# Patient Record
Sex: MALE | Race: WHITE | Employment: OTHER | URBAN - METROPOLITAN AREA
[De-identification: names, ages, dates, MRNs, and addresses within clinical notes are randomized per-mention and may not be internally consistent; named-entity substitution may affect disease eponyms.]

---

## 2017-01-02 ENCOUNTER — THERAPY VISIT (OUTPATIENT)
Dept: PHYSICAL THERAPY | Facility: CLINIC | Age: 67
End: 2017-01-02
Payer: COMMERCIAL

## 2017-01-02 DIAGNOSIS — M54.50 ACUTE BILATERAL LOW BACK PAIN WITHOUT SCIATICA: Primary | ICD-10-CM

## 2017-01-02 PROCEDURE — 97110 THERAPEUTIC EXERCISES: CPT | Mod: GP | Performed by: PHYSICAL THERAPIST

## 2017-01-02 PROCEDURE — 97161 PT EVAL LOW COMPLEX 20 MIN: CPT | Mod: GP | Performed by: PHYSICAL THERAPIST

## 2017-01-05 ENCOUNTER — THERAPY VISIT (OUTPATIENT)
Dept: PHYSICAL THERAPY | Facility: CLINIC | Age: 67
End: 2017-01-05
Payer: COMMERCIAL

## 2017-01-05 DIAGNOSIS — M54.50 ACUTE BILATERAL LOW BACK PAIN WITHOUT SCIATICA: Primary | ICD-10-CM

## 2017-01-05 PROCEDURE — 97110 THERAPEUTIC EXERCISES: CPT | Mod: GP | Performed by: PHYSICAL THERAPIST

## 2017-01-05 PROCEDURE — 97140 MANUAL THERAPY 1/> REGIONS: CPT | Mod: GP | Performed by: PHYSICAL THERAPIST

## 2017-01-09 ENCOUNTER — THERAPY VISIT (OUTPATIENT)
Dept: PHYSICAL THERAPY | Facility: CLINIC | Age: 67
End: 2017-01-09
Payer: COMMERCIAL

## 2017-01-09 DIAGNOSIS — M54.50 ACUTE BILATERAL LOW BACK PAIN WITHOUT SCIATICA: Primary | ICD-10-CM

## 2017-01-09 PROCEDURE — 97112 NEUROMUSCULAR REEDUCATION: CPT | Mod: GP | Performed by: PHYSICAL THERAPIST

## 2017-01-09 PROCEDURE — 97530 THERAPEUTIC ACTIVITIES: CPT | Mod: GP | Performed by: PHYSICAL THERAPIST

## 2017-01-09 PROCEDURE — 97110 THERAPEUTIC EXERCISES: CPT | Mod: GP | Performed by: PHYSICAL THERAPIST

## 2017-01-23 ENCOUNTER — THERAPY VISIT (OUTPATIENT)
Dept: PHYSICAL THERAPY | Facility: CLINIC | Age: 67
End: 2017-01-23
Payer: COMMERCIAL

## 2017-01-23 DIAGNOSIS — M54.50 ACUTE BILATERAL LOW BACK PAIN WITHOUT SCIATICA: Primary | ICD-10-CM

## 2017-01-23 PROCEDURE — 97530 THERAPEUTIC ACTIVITIES: CPT | Mod: GP | Performed by: PHYSICAL THERAPIST

## 2017-01-23 PROCEDURE — 97110 THERAPEUTIC EXERCISES: CPT | Mod: GP | Performed by: PHYSICAL THERAPIST

## 2017-02-02 ENCOUNTER — THERAPY VISIT (OUTPATIENT)
Dept: PHYSICAL THERAPY | Facility: CLINIC | Age: 67
End: 2017-02-02
Payer: COMMERCIAL

## 2017-02-02 DIAGNOSIS — M54.50 ACUTE BILATERAL LOW BACK PAIN WITHOUT SCIATICA: Primary | ICD-10-CM

## 2017-02-02 PROCEDURE — 97110 THERAPEUTIC EXERCISES: CPT | Mod: GP | Performed by: PHYSICAL THERAPIST

## 2017-02-02 PROCEDURE — 97140 MANUAL THERAPY 1/> REGIONS: CPT | Mod: GP | Performed by: PHYSICAL THERAPIST

## 2017-02-02 NOTE — PROGRESS NOTES
Subjective:    HPI  Oswestry Score: 0 %                 Objective:    System    Physical Exam    General     ROS    Assessment/Plan:      PROGRESS  REPORT    Progress reporting period is from 1/2/17 to 2/2/17.       SUBJECTIVE  Subjective changes noted by patient:  Patient reports his back is doing much better overall and has been having minimal pain the past couple week. He reports HEP is going well and that he is excited to get back out on his bike. He has been going to the gym and doing the stationary bike and will get a bit sore after this. Patient reports he feels comfortable continuing with HEP going forward.    Current Pain level: 0/10.      Initial Pain level: 9/10.   Changes in function:  Yes (See Goal flowsheet attached for changes in current functional level)  Adverse reaction to treatment or activity: None    OBJECTIVE  Changes noted in objective findings: AROM Lumbar flexion min loss, extension min-mod loss. Patient demonstrates with improved arm swing and trunk rotation with ambulation. He does demonstrate with lumbar segmental hypomobility which restricts extension motion a bit.     ASSESSMENT/PLAN  Updated problem list and treatment plan: Diagnosis 1:  Low back pain    Decreased ROM/flexibility - manual therapy, therapeutic exercise and home program  Impaired muscle performance - neuro re-education and home program  STG/LTGs have been met or progress has been made towards goals:  Yes (See Goal flow sheet completed today.)  Assessment of Progress: The patient's condition is improving.  The patient has met all of their long term goals.  Self Management Plans:  Patient has been instructed in a home treatment program.  Patient  has been instructed in self management of symptoms.  I have re-evaluated this patient and find that the nature, scope, duration and intensity of the therapy is appropriate for the medical condition of the patient.  Jose Cruz continues to require the following intervention to meet STG  and LTG's:  Continuation with HEP for near future    Recommendations:  Patient is encouraged to call PT if any questions/concerns arise regarding HEP or if he experiences any exacerbation of symptoms in the near future. Will follow-up in 3-4 weeks if further therapy is indicated by change in symptoms. Discharge from PT at that time.      Please refer to the daily flowsheet for treatment today, total treatment time and time spent performing 1:1 timed codes.

## 2017-02-21 ENCOUNTER — THERAPY VISIT (OUTPATIENT)
Dept: NEUROLOGY | Facility: CLINIC | Age: 67
End: 2017-02-21

## 2017-02-21 NOTE — MR AVS SNAPSHOT
After Visit Summary   2/21/2017    Jose Cruz Jameson    MRN: [id number]           Patient Information     Date Of Birth          1950        Visit Information        Provider Department      2/21/2017 1:30 PM Alyssa Goldsmith OT [facility]         Follow-ups after your visit        Who to contact     Please call your clinic at [phone] to:    Ask questions about your health    Make or cancel appointments    Discuss your medicines    Learn about your test results    Speak to your doctor   If you have compliments or concerns about an experience at your clinic, or if you wish to file a complaint, please contact [facility] Patient Relations at [phone] or email us at [email or web address]         Additional Information About Your Visit        MyChart Information     Healthcare ITt gives you secure access to your electronic health record. If you see a primary care provider, you can also send messages to your care team and make appointments. If you have questions, please call your primary care clinic.  If you do not have a primary care provider, please call [phone] and they will assist you.      Native is an electronic gateway that provides easy, online access to your medical records. With Native, you can request a clinic appointment, read your test results, renew a prescription or communicate with your care team.     To access your existing account, please contact your [facility] or call [phone] for assistance.        Care EveryWhere ID     This is your Care EveryWhere ID. This could be used by other organizations to access your [place] medical records  [id number]         Blood Pressure from Last 3 Encounters:   12/30/16 120/80   11/19/16 122/68   07/11/16 105/64    Weight from Last 3 Encounters:   12/30/16 179 lb 6.4 oz (81.4 kg)   07/11/16 174 lb 12.8 oz (79.3 kg)   06/01/15 182 lb (82.6 kg)              Today, you  had the following     No orders found for display       Primary Care Provider Office Phone # Fax #    Vanessa Lewis [phone]       [facility] [place] [address] [place] [address] [address] [place] [address]        Thank you!     Thank you for choosing [facility]  for your care. Our goal is always to provide you with excellent care. Hearing back from our patients is one way we can continue to improve our services. Please take a few minutes to complete the written survey that you may receive in the mail after your visit with us. Thank you!             Your Updated Medication List - Protect others around you: Learn how to safely use, store and throw away your medicines at www.disposemymeds.org.          This list is accurate as of: 2/21/17  3:58 PM.  Always use your most recent med list.                   Brand Name Dispense Instructions for use    DAILY MULTIVITAMIN PO      Take 1 tablet by mouth daily.       donepezil 10 MG tablet    ARICEPT    90 tablet    Take 1 tablet (10 mg) by mouth daily       ibuprofen 200 MG capsule      Take 600 mg by mouth every 4 hours as needed.       memantine 10 MG tablet    NAMENDA    180 tablet    Take 1 tablet (10 mg) by mouth 2 times daily       sertraline 50 MG tablet    ZOLOFT    45 tablet    Take 0.5 tablets (25 mg) by mouth daily       Vitamin E 200 UNITS Tabs      Take 400 Units by mouth 2 times daily

## 2017-02-21 NOTE — PROGRESS NOTES
OCCUPATIONAL THERAPY TREATMENT  [facility]    [facility] OT CHARGES  98448 (65 minutes) - OT Tx  TOTAL = 65 minutes  OT Dx - Moderate cognitive impairment

## 2017-05-03 ENCOUNTER — TELEPHONE (OUTPATIENT)
Dept: FAMILY MEDICINE | Facility: CLINIC | Age: 67
End: 2017-05-03

## 2017-05-03 NOTE — TELEPHONE ENCOUNTER
5/3/2017    Call Regarding Preventive Health Screening Colonoscopy    Attempt 1    Message on voicemail     Comments:       Outreach   KV

## 2017-06-20 ENCOUNTER — OFFICE VISIT (OUTPATIENT)
Dept: FAMILY MEDICINE | Facility: CLINIC | Age: 67
End: 2017-06-20
Payer: COMMERCIAL

## 2017-06-20 VITALS
SYSTOLIC BLOOD PRESSURE: 104 MMHG | DIASTOLIC BLOOD PRESSURE: 66 MMHG | HEART RATE: 76 BPM | HEIGHT: 74 IN | TEMPERATURE: 97.2 F | WEIGHT: 175.4 LBS | BODY MASS INDEX: 22.51 KG/M2

## 2017-06-20 DIAGNOSIS — F02.80 EARLY ONSET ALZHEIMER'S DEMENTIA WITHOUT BEHAVIORAL DISTURBANCE (H): ICD-10-CM

## 2017-06-20 DIAGNOSIS — F32.5 MAJOR DEPRESSION IN COMPLETE REMISSION (H): ICD-10-CM

## 2017-06-20 DIAGNOSIS — Z13.6 SCREENING FOR AAA (AORTIC ABDOMINAL ANEURYSM): ICD-10-CM

## 2017-06-20 DIAGNOSIS — G30.0 EARLY ONSET ALZHEIMER'S DEMENTIA WITHOUT BEHAVIORAL DISTURBANCE (H): ICD-10-CM

## 2017-06-20 DIAGNOSIS — Z23 NEED FOR VACCINATION: ICD-10-CM

## 2017-06-20 DIAGNOSIS — Z00.00 ENCOUNTER FOR ROUTINE ADULT HEALTH EXAMINATION WITHOUT ABNORMAL FINDINGS: Primary | ICD-10-CM

## 2017-06-20 DIAGNOSIS — E78.5 HYPERLIPIDEMIA LDL GOAL <100: ICD-10-CM

## 2017-06-20 LAB
ALBUMIN SERPL-MCNC: 3.8 G/DL (ref 3.4–5)
ALP SERPL-CCNC: 88 U/L (ref 40–150)
ALT SERPL W P-5'-P-CCNC: 24 U/L (ref 0–70)
ANION GAP SERPL CALCULATED.3IONS-SCNC: 5 MMOL/L (ref 3–14)
AST SERPL W P-5'-P-CCNC: 25 U/L (ref 0–45)
BILIRUB SERPL-MCNC: 0.4 MG/DL (ref 0.2–1.3)
BUN SERPL-MCNC: 14 MG/DL (ref 7–30)
CALCIUM SERPL-MCNC: 8.9 MG/DL (ref 8.5–10.1)
CHLORIDE SERPL-SCNC: 102 MMOL/L (ref 94–109)
CHOLEST SERPL-MCNC: 182 MG/DL
CO2 SERPL-SCNC: 27 MMOL/L (ref 20–32)
CREAT SERPL-MCNC: 0.92 MG/DL (ref 0.66–1.25)
GFR SERPL CREATININE-BSD FRML MDRD: 82 ML/MIN/1.7M2
GLUCOSE SERPL-MCNC: 92 MG/DL (ref 70–99)
HDLC SERPL-MCNC: 55 MG/DL
LDLC SERPL CALC-MCNC: 114 MG/DL
NONHDLC SERPL-MCNC: 127 MG/DL
POTASSIUM SERPL-SCNC: 4.3 MMOL/L (ref 3.4–5.3)
PROT SERPL-MCNC: 7.5 G/DL (ref 6.8–8.8)
SODIUM SERPL-SCNC: 134 MMOL/L (ref 133–144)
TRIGL SERPL-MCNC: 67 MG/DL

## 2017-06-20 PROCEDURE — 99212 OFFICE O/P EST SF 10 MIN: CPT | Mod: 25 | Performed by: FAMILY MEDICINE

## 2017-06-20 PROCEDURE — G0009 ADMIN PNEUMOCOCCAL VACCINE: HCPCS | Performed by: FAMILY MEDICINE

## 2017-06-20 PROCEDURE — 99397 PER PM REEVAL EST PAT 65+ YR: CPT | Mod: 25 | Performed by: FAMILY MEDICINE

## 2017-06-20 PROCEDURE — 90670 PCV13 VACCINE IM: CPT | Performed by: FAMILY MEDICINE

## 2017-06-20 PROCEDURE — 90472 IMMUNIZATION ADMIN EACH ADD: CPT | Performed by: FAMILY MEDICINE

## 2017-06-20 PROCEDURE — 80053 COMPREHEN METABOLIC PANEL: CPT | Performed by: FAMILY MEDICINE

## 2017-06-20 PROCEDURE — 36415 COLL VENOUS BLD VENIPUNCTURE: CPT | Performed by: FAMILY MEDICINE

## 2017-06-20 PROCEDURE — 90715 TDAP VACCINE 7 YRS/> IM: CPT | Performed by: FAMILY MEDICINE

## 2017-06-20 PROCEDURE — 80061 LIPID PANEL: CPT | Performed by: FAMILY MEDICINE

## 2017-06-20 RX ORDER — DONEPEZIL HYDROCHLORIDE 10 MG/1
10 TABLET, FILM COATED ORAL DAILY
Qty: 90 TABLET | Refills: 3 | Status: SHIPPED | OUTPATIENT
Start: 2017-06-20 | End: 2018-04-30

## 2017-06-20 RX ORDER — MEMANTINE HYDROCHLORIDE 10 MG/1
10 TABLET ORAL 2 TIMES DAILY
Qty: 180 TABLET | Refills: 3 | Status: SHIPPED | OUTPATIENT
Start: 2017-06-20 | End: 2018-04-30

## 2017-06-20 NOTE — NURSING NOTE
"Initial /66  Pulse 76  Temp 97.2  F (36.2  C) (Tympanic)  Ht 6' 1.75\" (1.873 m)  Wt 175 lb 6.4 oz (79.6 kg)  BMI 22.67 kg/m2 Estimated body mass index is 22.67 kg/(m^2) as calculated from the following:    Height as of this encounter: 6' 1.75\" (1.873 m).    Weight as of this encounter: 175 lb 6.4 oz (79.6 kg). .      "

## 2017-06-20 NOTE — MR AVS SNAPSHOT
After Visit Summary   6/20/2017    Jose Cruz Jameson    MRN: [id number]           Patient Information     Date Of Birth          1950        Visit Information        Provider Department      6/20/2017 8:20 AM Vanessa Lewis DO [facility]        Today's Diagnoses     Screening for AAA (aortic abdominal aneurysm)    -  1    Early onset Alzheimer's dementia without behavioral disturbance        Hyperlipidemia LDL goal <100        Major depression in complete remission (H)          Care Instructions    We updated your tetanus, pertussis and pneumonia shot today.  You will be due for 1 additional pneumonia shot next year.  Please talk with your insurance about the best place to get your shingles vaccine    You can call [phone] to schedule the ultrasound at the [facility].    I will let you know lab results when I see them.    Please feel free to contact me any time with any concerns.      Preventive Health Recommendations:       Male Ages 65 and over    Yearly exam:             See your health care provider every year in order to  o   Review health changes.   o   Discuss preventive care.    o   Review your medicines if your doctor has prescribed any.    Talk with your health care provider about whether you should have a test to screen for prostate cancer (PSA).    Every 3 years, have a diabetes test (fasting glucose). If you are at risk for diabetes, you should have this test more often.    Every 5 years, have a cholesterol test. Have this test more often if you are at risk for high cholesterol or heart disease.     Every 10 years, have a colonoscopy. Or, have a yearly FIT test (stool test). These exams will check for colon cancer.    Talk to with your health care provider about screening for Abdominal Aortic Aneurysm if you have a family history of AAA or have a history of smoking.  Shots:     Get a flu shot each year.     Get a tetanus shot every 10 years.     Talk to your  doctor about your pneumonia vaccines. There are now two you should receive - Pneumovax (PPSV 23) and Prevnar (PCV 13).    Talk to your doctor about a shingles vaccine.     Talk to your doctor about the hepatitis B vaccine.  Nutrition:     Eat at least 5 servings of fruits and vegetables each day.     Eat whole-grain bread, whole-wheat pasta and brown rice instead of white grains and rice.     Talk to your doctor about Calcium and Vitamin D.   Lifestyle    Exercise for at least 150 minutes a week (30 minutes a day, 5 days a week). This will help you control your weight and prevent disease.     Limit alcohol to one drink per day.     No smoking.     Wear sunscreen to prevent skin cancer.     See your dentist every six months for an exam and cleaning.     See your eye doctor every 1 to 2 years to screen for conditions such as glaucoma, macular degeneration and cataracts.          Follow-ups after your visit        Your next 10 appointments already scheduled     Jul 10, 2017   Pro[place] with [place]upal Howell [place]   [facility] [place]    [address] [place]  [facility] [id number] [phone]              Future tests that were ordered for you today     Open Future Orders        Priority Expected Expires Ordered    US abdominal aorta limited Routine  6/20/2018 6/20/2017            Who to contact     Normal or non-critical lab and imaging results will be communicated to you by MyChart, letter or phone within 4 business days after the clinic has received the results. If you do not hear from us within 7 days, please contact the clinic through MyChart or phone. If you have a critical or abnormal lab result, we will notify you by phone as soon as possible.  Submit refill requests through Med.ly or call your pharmacy and they will forward the refill request to us. Please allow 3 business days for your refill to be completed.          If you need to speak with a  for additional information ,  "please call: [phone]           Additional Information About Your Visit        EDITION [employer] Information     News Corpt gives you secure access to your electronic health record. If you see a primary care provider, you can also send messages to your care team and make appointments. If you have questions, please call your primary care clinic.  If you do not have a primary care provider, please call [phone] and they will assist you.        Care EveryWhere ID     This is your Care EveryWhere ID. This could be used by other organizations to access your [place] medical records  [id number]        Your Vitals Were     Pulse Temperature Height BMI (Body Mass Index)          76 97.2  F (36.2  C) (Tympanic) 6' 1.75\" (1.873 m) 22.67 kg/m2         Blood Pressure from Last 3 Encounters:   06/20/17 104/66   12/30/16 120/80   11/19/16 122/68    Weight from Last 3 Encounters:   06/20/17 175 lb 6.4 oz (79.6 kg)   12/30/16 179 lb 6.4 oz (81.4 kg)   07/11/16 174 lb 12.8 oz (79.3 kg)              We Performed the Following     Comprehensive metabolic panel     Lipid Profile with reflex to direct LDL          Where to get your medicines      These medications were sent to [facility] - [address]  [address] [place] [id number]     Phone:  [phone]     donepezil 10 MG tablet    memantine 10 MG tablet    sertraline 50 MG tablet          Primary Care Provider Office Phone # Fax #    Vanessa Lewis [phone]       [facility] [address] [place] [address]        Thank you!     Thank you for choosing [facility]  for your care. Our goal is always to provide you with excellent care. Hearing back from our patients is one way we can continue to improve our services. Please take a few minutes to complete the written survey that you may receive in the mail after your visit with us. Thank you!             Your Updated " Medication List - Protect others around you: Learn how to safely use, store and throw away your medicines at www.disposemymeds.org.          This list is accurate as of: 6/20/17  9:01 AM.  Always use your most recent med list.                   Brand Name Dispense Instructions for use    DAILY MULTIVITAMIN PO      Take 1 tablet by mouth daily.       donepezil 10 MG tablet    ARICEPT    90 tablet    Take 1 tablet (10 mg) by mouth daily       ibuprofen 200 MG capsule      Take 600 mg by mouth every 4 hours as needed.       memantine 10 MG tablet    NAMENDA    180 tablet    Take 1 tablet (10 mg) by mouth 2 times daily       sertraline 50 MG tablet    ZOLOFT    45 tablet    Take 0.5 tablets (25 mg) by mouth daily       Vitamin E 200 UNITS Tabs      Take 400 Units by mouth 2 times daily

## 2017-06-20 NOTE — PATIENT INSTRUCTIONS
We updated your tetanus, pertussis and pneumonia shot today.  You will be due for 1 additional pneumonia shot next year.  Please talk with your insurance about the best place to get your shingles vaccine    You can call [phone] to schedule the ultrasound at the [facility].    I will let you know lab results when I see them.    Please feel free to contact me any time with any concerns.      Preventive Health Recommendations:       Male Ages 65 and over    Yearly exam:             See your health care provider every year in order to  o   Review health changes.   o   Discuss preventive care.    o   Review your medicines if your doctor has prescribed any.    Talk with your health care provider about whether you should have a test to screen for prostate cancer (PSA).    Every 3 years, have a diabetes test (fasting glucose). If you are at risk for diabetes, you should have this test more often.    Every 5 years, have a cholesterol test. Have this test more often if you are at risk for high cholesterol or heart disease.     Every 10 years, have a colonoscopy. Or, have a yearly FIT test (stool test). These exams will check for colon cancer.    Talk to with your health care provider about screening for Abdominal Aortic Aneurysm if you have a family history of AAA or have a history of smoking.  Shots:     Get a flu shot each year.     Get a tetanus shot every 10 years.     Talk to your doctor about your pneumonia vaccines. There are now two you should receive - Pneumovax (PPSV 23) and Prevnar (PCV 13).    Talk to your doctor about a shingles vaccine.     Talk to your doctor about the hepatitis B vaccine.  Nutrition:     Eat at least 5 servings of fruits and vegetables each day.     Eat whole-grain bread, whole-wheat pasta and brown rice instead of white grains and rice.     Talk to your doctor about Calcium and Vitamin D.   Lifestyle    Exercise for at least 150 minutes a week (30 minutes a day, 5 days a week). This  will help you control your weight and prevent disease.     Limit alcohol to one drink per day.     No smoking.     Wear sunscreen to prevent skin cancer.     See your dentist every six months for an exam and cleaning.     See your eye doctor every 1 to 2 years to screen for conditions such as glaucoma, macular degeneration and cataracts.

## 2017-06-20 NOTE — LETTER
My Depression Action Plan  Name: Jose Cruz Jameson   Date of Birth 1950  Date: 6/20/2017    My doctor: Vanessa Lewis   My clinic: [address] [id number] [phone]          GREEN    ZONE   Good Control    What it looks like:     Things are going generally well. You have normal up s and down s. You may even feel depressed from time to time, but bad moods usually last less than a day.   What you need to do:  1. Continue to care for yourself (see self care plan)  2. Check your depression survival kit and update it as needed  3. Follow your physician s recommendations including any medication.  4. Do not stop taking medication unless you consult with your physician first.           YELLOW         ZONE Getting Worse    What it looks like:     Depression is starting to interfere with your life.     It may be hard to get out of bed; you may be starting to isolate yourself from others.    Symptoms of depression are starting to last most all day and this has happened for several days.     You may have suicidal thoughts but they are not constant.   What you need to do:     1. Call your care team, your response to treatment will improve if you keep your care team informed of your progress. Yellow periods are signs an adjustment may need to be made.     2. Continue your self-care, even if you have to fake it!    3. Talk to someone in your support network    4. Open up your depression survival kit           RED    ZONE Medical Alert - Get Help    What it looks like:     Depression is seriously interfering with your life.     You may experience these or other symptoms: You can t get out of bed most days, can t work or engage in other necessary activities, you have trouble taking care of basic hygiene, or basic responsibilities, thoughts of suicide or death that will not go away, self-injurious behavior.     What you need to do:  1. Call your care team and  request a same-day appointment. If they are not available (weekends or after hours) call your local crisis line, emergency room or 911.      Electronically signed by: Jennifer Downing, June 20, 2017    Depression Self Care Plan / Survival Kit    Self-Care for Depression  Here s the deal. Your body and mind are really not as separate as most people think.  What you do and think affects how you feel and how you feel influences what you do and think. This means if you do things that people who feel good do, it will help you feel better.  Sometimes this is all it takes.  There is also a place for medication and therapy depending on how severe your depression is, so be sure to consult with your medical provider and/ or Behavioral Health Consultant if your symptoms are worsening or not improving.     In order to better manage my stress, I will:    Exercise  Get some form of exercise, every day. This will help reduce pain and release endorphins, the  feel good  chemicals in your brain. This is almost as good as taking antidepressants!  This is not the same as joining a gym and then never going! (they count on that by the way ) It can be as simple as just going for a walk or doing some gardening, anything that will get you moving.      Hygiene   Maintain good hygiene (Get out of bed in the morning, Make your bed, Brush your teeth, Take a shower, and Get dressed like you were going to work, even if you are unemployed).  If your clothes don't fit try to get ones that do.    Diet  I will strive to eat foods that are good for me, drink plenty of water, and avoid excessive sugar, caffeine, alcohol, and other mood-altering substances.  Some foods that are helpful in depression are: complex carbohydrates, B vitamins, flaxseed, fish or fish oil, fresh fruits and vegetables.    Psychotherapy  I agree to participate in Individual Therapy (if recommended).    Medication  If prescribed medications, I agree to take them.  Missing doses  can result in serious side effects.  I understand that drinking alcohol, or other illicit drug use, may cause potential side effects.  I will not stop my medication abruptly without first discussing it with my provider.    Staying Connected With Others  I will stay in touch with my friends, family members, and my primary care provider/team.    Use your imagination  Be creative.  We all have a creative side; it doesn t matter if it s oil painting, sand castles, or mud pies! This will also kick up the endorphins.    Witness Beauty  (AKA stop and smell the roses) Take a look outside, even in mid-winter. Notice colors, textures. Watch the squirrels and birds.     Service to others  Be of service to others.  There is always someone else in need.  By helping others we can  get out of ourselves  and remember the really important things.  This also provides opportunities for practicing all the other parts of the program.    Humor  Laugh and be silly!  Adjust your TV habits for less news and crime-drama and more comedy.    Control your stress  Try breathing deep, massage therapy, biofeedback, and meditation. Find time to relax each day.     My support system    Clinic Contact:  Phone number:    Contact 1:  Phone number:    Contact 2:  Phone number:    Druze/:  Phone number:    Therapist:  Phone number:    Local crisis center:    Phone number:    Other community support:  Phone number:

## 2017-06-20 NOTE — PROGRESS NOTES
SUBJECTIVE:                                                            Jose Cruz Jameson is a 66 year old male who presents for Preventive Visit.    Are you in the first 12 months of your Medicare Part B coverage?  No    Healthy Habits:    Do you get at least three servings of calcium containing foods daily (dairy, green leafy vegetables, etc.)? yes    Amount of exercise or daily activities, outside of work: 5 day(s) per week    Problems taking medications regularly No    Medication side effects: No    Have you had an eye exam in the past two years? no    Do you see a dentist twice per year? yes    Do you have sleep apnea, excessive snoring or daytime drowsiness?no    COGNITIVE SCREENING:  Not appropriate due to known dementia    Has his screening colonoscopy next week.    Reviewed Hep C screening, declined by pt's wife    Has continued to have progression of his dementia.  Did do additional eval last year with OT regarding his driving and home safety on my recommendation.  The eval solidified for pt's wife that he cannot be driving or out on his own.  He no longer drives at all and no longer goes on bike rides unaccompanied.   He is still home alone during the day while his wife is at work.  She recognizes this is a risk and they are looking at day programs for him to go too.  He does have friends and family who stop by during the day to visit and check on him.  Cost is an issue.  She has visited with social work and has the resources available to her.  She feels they are currently coping at home okay    Reviewed and updated as needed this visit by clinical staff  Tobacco  Allergies  Meds  Med Hx  Surg Hx  Fam Hx  Soc Hx        Reviewed and updated as needed this visit by Provider  Tobacco  Med Hx  Surg Hx  Fam Hx  Soc Hx       Social History   Substance Use Topics     Smoking status: Former Smoker     Packs/day: 0.15     Years: 20.00     Types: Cigarettes     Start date: 11/29/2013     Smokeless tobacco:  Never Used      Comment: counseled 9/11/08     Alcohol use 1.5 oz/week     3 Cans of beer per week      Comment: oocas.        The patient does not drink >3 drinks per day nor >7 drinks per week.    Today's PHQ-2 Score:   PHQ-2 ( 1999 Pfizer) 6/20/2017 7/11/2016   Q1: Little interest or pleasure in doing things 0 0   Q2: Feeling down, depressed or hopeless 0 0   PHQ-2 Score 0 0       Do you feel safe in your environment - Yes    Do you have a Health Care Directive?: Yes: Advance Directive has been received and scanned.    Current providers sharing in care for this patient include:   Patient Care Team:  Vanessa Lewis DO as PCP - General (Family Practice)  Lucio Fernandez MD as MD (Neurology)  Antonio Smith MD as MD (Neurology)      Hearing impairment: No    Ability to successfully perform activities of daily living: Yes, no assistance needed     Fall risk:  Fallen 2 or more times in the past year?: No  Any fall with injury in the past year?: No    Home safety:  none identified      The following health maintenance items are reviewed in Epic and correct as of today:  Health Maintenance   Topic Date Due     COLONOSCOPY Q10 YR  12/07/2014     AORTIC ANEURYSM SCREENING (SYSTEM ASSIGNED)  10/31/2016     ADVANCE DIRECTIVE PLANNING Q5 YRS  11/22/2016     PHQ-9 Q6 MONTHS  06/30/2017     INFLUENZA VACCINE (SYSTEM ASSIGNED)  09/01/2017     FALL RISK ASSESSMENT  06/20/2018     PNEUMOCOCCAL (2 of 2 - PPSV23) 06/20/2018     DEPRESSION ACTION PLAN Q1 YR  06/20/2018     LIPID SCREEN Q5 YR MALE (SYSTEM ASSIGNED)  06/20/2022     TETANUS IMMUNIZATION (SYSTEM ASSIGNED)  06/20/2027     HEPATITIS C SCREENING  Addressed         Pneumonia Vaccine:Adults age 65+ who have not received previous Pneumovax (PPSV23) or PCV13 as an adult: Should first be given PCV13 AND then should be given PPSV23 6-12 months after PCV13     ROS:  Constitutional, HEENT, cardiovascular, pulmonary, gi and gu systems are negative, except as otherwise  "noted.    OBJECTIVE:                                                            /66  Pulse 76  Temp 97.2  F (36.2  C) (Tympanic)  Ht 6' 1.75\" (1.873 m)  Wt 175 lb 6.4 oz (79.6 kg)  BMI 22.67 kg/m2 Estimated body mass index is 22.67 kg/(m^2) as calculated from the following:    Height as of this encounter: 6' 1.75\" (1.873 m).    Weight as of this encounter: 175 lb 6.4 oz (79.6 kg).  EXAM:   GENERAL: healthy, alert and no distress  EYES: Eyes grossly normal to inspection, PERRL and conjunctivae and sclerae normal  HENT: ear canals and TM's normal, nose and mouth without ulcers or lesions  NECK: no adenopathy, no asymmetry, masses, or scars and thyroid normal to palpation  RESP: lungs clear to auscultation - no rales, rhonchi or wheezes  CV: regular rate and rhythm, normal S1 S2, no S3 or S4, no murmur, click or rub, no peripheral edema and peripheral pulses strong  ABDOMEN: soft, nontender, no hepatosplenomegaly, no masses and bowel sounds normal  MS: no gross musculoskeletal defects noted, no edema  NEURO: Normal strength and tone, mentation intact and speech normal  PSYCH: affect normal/bright    ASSESSMENT / PLAN:                                                                ICD-10-CM    1. Encounter for routine adult health examination without abnormal findings Z00.00    2. Early onset Alzheimer's dementia without behavioral disturbance G30.0 Comprehensive metabolic panel    F02.80 donepezil (ARICEPT) 10 MG tablet     memantine (NAMENDA) 10 MG tablet   3. Hyperlipidemia LDL goal <100 E78.5 Lipid Profile with reflex to direct LDL     Comprehensive metabolic panel   4. Major depression in complete remission (H) F32.5 Comprehensive metabolic panel     sertraline (ZOLOFT) 50 MG tablet   5. Screening for AAA (aortic abdominal aneurysm) Z13.6 US abdominal aorta limited   6. Need for vaccination Z23 Pneumococcal vaccine 13 valent PCV13 IM (Prevnar) [[id number]]     ADMIN MEDICARE: Pneumococcal Vaccine ()     " "TDAP VACCINE (ADACEL)     EA ADD'L VACCINE     Reviewed risk vs benefit of screening for Jose Cruz.  He is low risk for Hep C and wife declines screening.  Reviewed AAA screen, they elect to proceed.  Will consider if additional colon screening is necessary after this exam they currently have scheduled    End of Life Planning:  Patient currently has an advanced directive: Yes.  Practitioner is supportive of decision.    COUNSELING:  Reviewed preventive health counseling, as reflected in patient instructions  Special attention given to:       Consider AAA screening for ages 65-75 and smoking history       Regular exercise       Healthy diet/nutrition       Immunizations    Vaccinated for: Pneumococcal           Hepatitis C screening       Colon cancer screening       Osteoporosis Prevention/Bone Health      Estimated body mass index is 22.67 kg/(m^2) as calculated from the following:    Height as of this encounter: 6' 1.75\" (1.873 m).    Weight as of this encounter: 175 lb 6.4 oz (79.6 kg).     reports that he has quit smoking. His smoking use included Cigarettes. He started smoking about 3 years ago. He has a 3.00 pack-year smoking history. He has never used smokeless tobacco.      Appropriate preventive services were discussed with this patient, including applicable screening as appropriate for cardiovascular disease, diabetes, osteopenia/osteoporosis, and glaucoma.  As appropriate for age/gender, discussed screening for colorectal cancer, prostate cancer, breast cancer, and cervical cancer. Checklist reviewing preventive services available has been given to the patient.    Reviewed patients plan of care and provided an AVS. The Complex Care Plan (for patients with higher acuity and needing more deliberate coordination of services) for Jose Cruz meets the Care Plan requirement. This Care Plan has been established and reviewed with the Patient and spouse.    Counseling Resources:  ATP IV Guidelines  Pooled Cohorts Equation " Calculator  Breast Cancer Risk Calculator  FRAX Risk Assessment  ICSI Preventive Guidelines  Dietary Guidelines for Americans, 2010  USDA's MyPlate  ASA Prophylaxis  Lung CA Screening    Vanessa Lewis, DO  [facility]    Patient Instructions   We updated your tetanus, pertussis and pneumonia shot today.  You will be due for 1 additional pneumonia shot next year.  Please talk with your insurance about the best place to get your shingles vaccine    You can call [phone] to schedule the ultrasound at the [facility]    I will let you know lab results when I see them.    Please feel free to contact me any time with any concerns.      Preventive Health Recommendations:       Male Ages 65 and over    Yearly exam:             See your health care provider every year in order to  o   Review health changes.   o   Discuss preventive care.    o   Review your medicines if your doctor has prescribed any.    Talk with your health care provider about whether you should have a test to screen for prostate cancer (PSA).    Every 3 years, have a diabetes test (fasting glucose). If you are at risk for diabetes, you should have this test more often.    Every 5 years, have a cholesterol test. Have this test more often if you are at risk for high cholesterol or heart disease.     Every 10 years, have a colonoscopy. Or, have a yearly FIT test (stool test). These exams will check for colon cancer.    Talk to with your health care provider about screening for Abdominal Aortic Aneurysm if you have a family history of AAA or have a history of smoking.  Shots:     Get a flu shot each year.     Get a tetanus shot every 10 years.     Talk to your doctor about your pneumonia vaccines. There are now two you should receive - Pneumovax (PPSV 23) and Prevnar (PCV 13).    Talk to your doctor about a shingles vaccine.     Talk to your doctor about the hepatitis B vaccine.  Nutrition:     Eat at least 5 servings of fruits and  vegetables each day.     Eat whole-grain bread, whole-wheat pasta and brown rice instead of white grains and rice.     Talk to your doctor about Calcium and Vitamin D.   Lifestyle    Exercise for at least 150 minutes a week (30 minutes a day, 5 days a week). This will help you control your weight and prevent disease.     Limit alcohol to one drink per day.     No smoking.     Wear sunscreen to prevent skin cancer.     See your dentist every six months for an exam and cleaning.     See your eye doctor every 1 to 2 years to screen for conditions such as glaucoma, macular degeneration and cataracts.

## 2017-06-20 NOTE — LETTER
[facility]  [address] [place] [address]  [id number]  [phone]      June 22, 2017      Jose Cruz Jameson  [address] [address]              Dear Mr. Boxford,    Your electrolytes, kidney and liver testing were all normal.  Your cholesterol actually looks quite a bit better. Keep up the good work!       Sincerely,    Vanessa Lewis DO/EC CMA

## 2017-07-10 ENCOUNTER — HOSPITAL ENCOUNTER (OUTPATIENT)
Facility: AMBULATORY SURGERY CENTER | Age: 67
Discharge: HOME | End: 2017-07-10
Attending: SURGERY | Admitting: SURGERY
Payer: COMMERCIAL

## 2017-07-10 ENCOUNTER — SURGERY (OUTPATIENT)
Age: 67
End: 2017-07-10
Payer: COMMERCIAL

## 2017-07-10 VITALS
OXYGEN SATURATION: 95 % | RESPIRATION RATE: 16 BRPM | HEIGHT: 73 IN | SYSTOLIC BLOOD PRESSURE: 118 MMHG | BODY MASS INDEX: 23.19 KG/M2 | WEIGHT: 175 LBS | DIASTOLIC BLOOD PRESSURE: 69 MMHG | TEMPERATURE: 97.6 F

## 2017-07-10 PROCEDURE — 45378 DIAGNOSTIC COLONOSCOPY: CPT

## 2017-07-10 PROCEDURE — G8918 PT W/O PREOP ORDER IV AB PRO: HCPCS

## 2017-07-10 PROCEDURE — G0500 MOD SEDAT ENDO SERVICE >5YRS: HCPCS | Performed by: SURGERY

## 2017-07-10 PROCEDURE — 45378 DIAGNOSTIC COLONOSCOPY: CPT | Performed by: SURGERY

## 2017-07-10 PROCEDURE — G8907 PT DOC NO EVENTS ON DISCHARG: HCPCS

## 2017-07-10 RX ORDER — NALOXONE HYDROCHLORIDE 0.4 MG/ML
.1-.4 INJECTION, SOLUTION INTRAMUSCULAR; INTRAVENOUS; SUBCUTANEOUS
Status: DISCONTINUED | OUTPATIENT
Start: 2017-07-10 | End: 2017-07-11 | Stop reason: HOSPADM

## 2017-07-10 RX ORDER — LIDOCAINE 40 MG/G
CREAM TOPICAL
Status: DISCONTINUED | OUTPATIENT
Start: 2017-07-10 | End: 2017-07-11 | Stop reason: HOSPADM

## 2017-07-10 RX ORDER — FLUMAZENIL 0.1 MG/ML
0.2 INJECTION, SOLUTION INTRAVENOUS
Status: DISCONTINUED | OUTPATIENT
Start: 2017-07-10 | End: 2017-07-11 | Stop reason: HOSPADM

## 2017-07-10 RX ORDER — ONDANSETRON 2 MG/ML
4 INJECTION INTRAMUSCULAR; INTRAVENOUS
Status: DISCONTINUED | OUTPATIENT
Start: 2017-07-10 | End: 2017-07-11 | Stop reason: HOSPADM

## 2017-07-10 RX ORDER — ONDANSETRON 4 MG/1
4 TABLET, ORALLY DISINTEGRATING ORAL EVERY 6 HOURS PRN
Status: DISCONTINUED | OUTPATIENT
Start: 2017-07-10 | End: 2017-07-11 | Stop reason: HOSPADM

## 2017-07-10 RX ORDER — FENTANYL CITRATE 50 UG/ML
INJECTION, SOLUTION INTRAMUSCULAR; INTRAVENOUS PRN
Status: DISCONTINUED | OUTPATIENT
Start: 2017-07-10 | End: 2017-07-10 | Stop reason: HOSPADM

## 2017-07-10 RX ORDER — ONDANSETRON 2 MG/ML
4 INJECTION INTRAMUSCULAR; INTRAVENOUS EVERY 6 HOURS PRN
Status: DISCONTINUED | OUTPATIENT
Start: 2017-07-10 | End: 2017-07-11 | Stop reason: HOSPADM

## 2017-07-10 RX ADMIN — FENTANYL CITRATE 25 MCG: 50 INJECTION, SOLUTION INTRAMUSCULAR; INTRAVENOUS at 11:55

## 2017-07-10 RX ADMIN — FENTANYL CITRATE 50 MCG: 50 INJECTION, SOLUTION INTRAMUSCULAR; INTRAVENOUS at 11:51

## 2017-07-10 RX ADMIN — FENTANYL CITRATE 25 MCG: 50 INJECTION, SOLUTION INTRAMUSCULAR; INTRAVENOUS at 11:59

## 2017-07-11 ENCOUNTER — TELEPHONE (OUTPATIENT)
Dept: FAMILY MEDICINE | Facility: CLINIC | Age: 67
End: 2017-07-11

## 2017-07-11 NOTE — TELEPHONE ENCOUNTER
Pt spouse is calling and states that his GI dr / colonscopy dr told her that adelaida does not need a colonoscopy in 10 years and could just do a blood, wants  Dr ospina to know this as the report will reflect that he does need it in 10 years.     Geeta Vance, Station

## 2017-07-28 LAB — COLONOSCOPY: NORMAL

## 2017-09-02 DIAGNOSIS — F32.5 MAJOR DEPRESSION IN COMPLETE REMISSION (H): ICD-10-CM

## 2017-09-02 DIAGNOSIS — G30.0 EARLY ONSET ALZHEIMER'S DEMENTIA WITHOUT BEHAVIORAL DISTURBANCE (H): ICD-10-CM

## 2017-09-02 DIAGNOSIS — F02.80 EARLY ONSET ALZHEIMER'S DEMENTIA WITHOUT BEHAVIORAL DISTURBANCE (H): ICD-10-CM

## 2017-09-05 NOTE — TELEPHONE ENCOUNTER
Donepezil 10mg      Last Written Prescription Date: 06/20/2017 #90 x 3  Last filled 06/08/2017    Memantine 10mg      Last Written Prescription Date: 06/20/2017 #180 x 3  Last filled 06/08/2017    Last Office Visit with [facility], [facility] or [facility] prescribing provider: 06/20/2017 MARIKA Lewis           Sertraline 50mg     Last Written Prescription Date: 06/20/2017  #45 x 3  Last filled 06/08/2017  Last Office Visit with [facility] primary care provider:  06/20/2017 MARIKA Lewis        Last PHQ-9 score on record=   PHQ-9 SCORE 12/30/2016   Total Score -   Total Score 0

## 2017-09-06 NOTE — TELEPHONE ENCOUNTER
Please call [facility] pharmacy. It looks like Dr Lewis filled all three medictions for 1 year on 6/20/2017 Thanks, Beckie Mcrae RN

## 2017-10-24 ENCOUNTER — TELEPHONE (OUTPATIENT)
Dept: FAMILY MEDICINE | Facility: CLINIC | Age: 67
End: 2017-10-24

## 2017-10-24 NOTE — TELEPHONE ENCOUNTER
It likely is related to the Alzheimer's.  I would not recommend an over the counter sleep aid as that can exacerbate dementia symptoms.  We should really visit to discuss what is going on and appropriate options.    Vanessa Lewis

## 2017-10-24 NOTE — TELEPHONE ENCOUNTER
pt's spouse Lay stopped by the clinic and states that Jose Cruz has alzheimer's and has been having trouble Sleeping lately, he has been more agitated at night and waking up more , Lay  Is wondering if its the progression of alzheimer's  and would a tylenol pm be ok to give him or some alternative sleep aid. Please  Call to advise or send script to [facility] pharmacy.     Geeta Vance, Station

## 2017-10-31 ENCOUNTER — OFFICE VISIT (OUTPATIENT)
Dept: FAMILY MEDICINE | Facility: CLINIC | Age: 67
End: 2017-10-31
Payer: COMMERCIAL

## 2017-10-31 VITALS
BODY MASS INDEX: 24.25 KG/M2 | SYSTOLIC BLOOD PRESSURE: 120 MMHG | HEART RATE: 76 BPM | HEIGHT: 73 IN | WEIGHT: 183 LBS | DIASTOLIC BLOOD PRESSURE: 80 MMHG

## 2017-10-31 DIAGNOSIS — G47.20 DISRUPTED SLEEP-WAKE CYCLE: ICD-10-CM

## 2017-10-31 DIAGNOSIS — G30.0 EARLY ONSET ALZHEIMER'S DEMENTIA WITHOUT BEHAVIORAL DISTURBANCE (H): Primary | ICD-10-CM

## 2017-10-31 DIAGNOSIS — F02.80 EARLY ONSET ALZHEIMER'S DEMENTIA WITHOUT BEHAVIORAL DISTURBANCE (H): Primary | ICD-10-CM

## 2017-10-31 DIAGNOSIS — F51.8 DISRUPTED SLEEP-WAKE CYCLE: ICD-10-CM

## 2017-10-31 DIAGNOSIS — Z11.1 SCREENING EXAMINATION FOR PULMONARY TUBERCULOSIS: ICD-10-CM

## 2017-10-31 PROCEDURE — 99214 OFFICE O/P EST MOD 30 MIN: CPT | Performed by: FAMILY MEDICINE

## 2017-10-31 PROCEDURE — 86580 TB INTRADERMAL TEST: CPT | Performed by: FAMILY MEDICINE

## 2017-10-31 ASSESSMENT — PATIENT HEALTH QUESTIONNAIRE - PHQ9: SUM OF ALL RESPONSES TO PHQ QUESTIONS 1-9: 1

## 2017-10-31 NOTE — PATIENT INSTRUCTIONS
Mantoux was administered today at 12:14pm.  It will need to be read in 48-72 hours, after 12:14 pm on Thursday or before 12:14pm on Friday.

## 2017-10-31 NOTE — PROGRESS NOTES
SUBJECTIVE:   Jose Cruz Jameson is a 67 year old male who presents to clinic today for the following health issues:    - See telephone encounter from 10/24/2017.    Medication Followup of Alzheimer's   Aricept 10mg qd, Namenda 10mg bid,    Taking Medication as prescribed: yes    Side Effects:  Sleep disturbance    Medication Helping Symptoms:  Per wife he is having trouble sleeping at night and also seems more agited       Depression Followup  Zoloft 25mg qd    Status since last visit: Per daughter she states that he is not depressed    See PHQ-9 for current symptoms.  Other associated symptoms: None    Complicating factors:   Significant life event:  No   Current substance abuse:  None  Anxiety or Panic symptoms:  No    PHQ-9 Score and MyChart F/U Questions 12/30/2016 10/31/2017   Total Score 0 1   Q9: Suicide Ideation Not at all Not at all     In the past two weeks have you had thoughts of suicide or self-harm?  No.    Do you have concerns about your personal safety or the safety of others?   No       PHQ-9  English  PHQ-9   Any Language  Suicide Assessment Five-step Evaluation and Treatment (SAFE-T)      Has been struggling more with sleep over the last few weeks.  Has had some changes recently.  Has traveled twice and stayed with family members for a few nights.  This was positive but was a big change.  Also just recently started an adult day program while his wife is at work.    Has been falling asleep normally but waking frequently throughout the night.  At night he is home with his wife and adult daughter.  He shares a bed with his wife.  He will get up to go to the bathroom or just be up moving about the room.  He does not go downstairs on his own and they have made an effort to remove things he might trip on or fall over.  Pt's wife has no concerns with him leaving the house or using kitchen equipment.    She is wondeirng ig there are medications available to help him to sleep better.    Problem list and  histories reviewed & adjusted, as indicated.  Additional history: as documented    Reviewed and updated as needed this visit by clinical staffTobacco  Allergies  Meds  Med Hx  Surg Hx  Fam Hx  Soc Hx      Reviewed and updated as needed this visit by Provider  Tobacco  Med Hx  Surg Hx  Fam Hx  Soc Hx        ROS: Remainder of Constitutional, CV, Respiratory, GI,  negative with exception of that mentioned above    PE:  VS as above   Gen:  WN/WD/WH male in NAD   Psych: Alert and oriented times 2 only; coherent speech, normal   rate and volume, no hallucinations   or delusions  His affect is bright    A/P:      ICD-10-CM    1. Early onset Alzheimer's dementia without behavioral disturbance G30.0     F02.80    2. Disrupted sleep-wake cycle G47.20     F51.8    3. Screening examination for pulmonary tuberculosis Z11.1 TB INTRADERMAL TEST     Patient Instructions   Mantoux was administered today at 12:14pm.  It will need to be read in 48-72 hours, after 12:14 pm on Thursday or before 12:14pm on Friday.    Reviewed with pt's wife that sleep disruption is likely a result in the change in routine.  Also reviewed sleep disruption as a sing of progression of underlying dementia process.  Reviewed risks of all the medications available for sleep including fall risk and risk of worsened cognitive function.  Advised avoiding.      Reviewed importance of bedtime routine and home safety.  She will let me know if things are not improving.

## 2017-10-31 NOTE — MR AVS SNAPSHOT
"              After Visit Summary   10/31/2017    Jose Cruz Jameson    MRN: [id number]           Patient Information     Date Of Birth          1950        Visit Information        Provider Department      10/31/2017 11:20 AM Vanessa Lewis DO [facility]        Today's Diagnoses     Screening examination for pulmonary tuberculosis    -  1      Care Instructions    Mantoux was administered today at 12:14pm.  It will need to be read in 48-72 hours, after 12:14 pm on Thursday or before 12:14pm on Friday.          Follow-ups after your visit        Who to contact     Normal or non-critical lab and imaging results will be communicated to you by [facility], letter or phone within 4 business days after the clinic has received the results. If you do not hear from us within 7 days, please contact the clinic through [facility] [employer] or phone. If you have a critical or abnormal lab result, we will notify you by phone as soon as possible.  Submit refill requests through [employer] or call your pharmacy and they will forward the refill request to us. Please allow 3 business days for your refill to be completed.          If you need to speak with a  for additional information , please call: [phone]           Additional Information About Your Visit        [employer] Information     [employer] gives you secure access to your electronic health record. If you see a primary care provider, you can also send messages to your care team and make appointments. If you have questions, please call your primary care clinic.  If you do not have a primary care provider, please call [phone] and they will assist you.        Care EveryWhere ID     This is your Care EveryWhere ID. This could be used by other organizations to access your [facility] medical records  [id number]        Your Vitals Were     Pulse Height BMI (Body Mass Index)             76 6' 1\" (1.854 m) 24.14 kg/m2          Blood Pressure from Last 3 " Encounters:   10/31/17 120/80   07/10/17 118/69   06/20/17 104/66    Weight from Last 3 Encounters:   10/31/17 183 lb (83 kg)   07/05/17 175 lb (79.4 kg)   06/20/17 175 lb 6.4 oz (79.6 kg)              We Performed the Following     TB INTRADERMAL TEST        Primary Care Provider Office Phone # Fax #    Vaenssa LewisDO [phone] [address] [place] [address]        Equal Access to Services     [facility]: Hadii aad ku hadasho Soomaali, waaxda luqadaha, qaybta kaalmada adeegyada, waxay weslyin hayjuan rivera . So [facility] [phone].    ATENCIÓN: Si habla español, tiene a brown disposición servicios gratuitos de asistencia lingüística. Llame al [phone].    We comply with applicable federal civil rights laws and [place] laws. We do not discriminate on the basis of race, color, national origin, age, disability, sex, sexual orientation, or gender identity.            Thank you!     [facility]. Our goal is always to provide you with excellent care. Hearing back from our patients is one way we can continue to improve our services. Please take a few minutes to complete the written survey that you may receive in the mail after your visit with us. Thank you!             Your Updated Medication List - Protect others around you: Learn how to safely use, store and throw away your medicines at www.disposemymeds.org.          This list is accurate as of: 10/31/17 12:18 PM.  Always use your most recent med list.                   Brand Name Dispense Instructions for use Diagnosis    DAILY MULTIVITAMIN PO      Take 1 tablet by mouth daily.        donepezil 10 MG tablet    ARICEPT    90 tablet    Take 1 tablet (10 mg) by mouth daily    Early onset Alzheimer's dementia without behavioral disturbance       ibuprofen 200 MG capsule      Take 600 mg by mouth every 4 hours as needed.        memantine 10 MG tablet    NAMENDA    180  tablet    Take 1 tablet (10 mg) by mouth 2 times daily    Early onset Alzheimer's dementia without behavioral disturbance       sertraline 50 MG tablet    ZOLOFT    45 tablet    Take 0.5 tablets (25 mg) by mouth daily    Major depression in complete remission (H)       Vitamin E 200 UNITS Tabs      Take 400 Units by mouth 2 times daily

## 2017-10-31 NOTE — NURSING NOTE
"Chief Complaint   Patient presents with     Dementia       Initial /80  Pulse 76  Ht 6' 1\" (1.854 m)  Wt 183 lb (83 kg)  BMI 24.14 kg/m2 Estimated body mass index is 24.14 kg/(m^2) as calculated from the following:    Height as of this encounter: 6' 1\" (1.854 m).    Weight as of this encounter: 183 lb (83 kg).  Medication Reconciliation: complete  "

## 2017-12-04 ENCOUNTER — OFFICE VISIT (OUTPATIENT)
Dept: FAMILY MEDICINE | Facility: CLINIC | Age: 67
End: 2017-12-04
Payer: COMMERCIAL

## 2017-12-04 VITALS
DIASTOLIC BLOOD PRESSURE: 64 MMHG | HEIGHT: 73 IN | HEART RATE: 64 BPM | WEIGHT: 185.6 LBS | SYSTOLIC BLOOD PRESSURE: 102 MMHG | BODY MASS INDEX: 24.6 KG/M2 | TEMPERATURE: 98.2 F

## 2017-12-04 DIAGNOSIS — R09.81 NASAL CONGESTION: ICD-10-CM

## 2017-12-04 DIAGNOSIS — G30.0 EARLY ONSET ALZHEIMER'S DEMENTIA WITHOUT BEHAVIORAL DISTURBANCE (H): Primary | ICD-10-CM

## 2017-12-04 DIAGNOSIS — F02.80 EARLY ONSET ALZHEIMER'S DEMENTIA WITHOUT BEHAVIORAL DISTURBANCE (H): Primary | ICD-10-CM

## 2017-12-04 DIAGNOSIS — F32.5 MAJOR DEPRESSION IN COMPLETE REMISSION (H): ICD-10-CM

## 2017-12-04 PROCEDURE — 99214 OFFICE O/P EST MOD 30 MIN: CPT | Performed by: FAMILY MEDICINE

## 2017-12-04 NOTE — MR AVS SNAPSHOT
After Visit Summary   12/4/2017    Jose Cruz Jameson    MRN: [id number]           Patient Information     Date Of Birth          1950        Visit Information        Provider Department      12/4/2017 4:00 PM Vanessa Lewis,  [facility]        Today's Diagnoses     Major depression in complete remission (H)          Care Instructions    You can try the flonase 2 sprays in each nostril once daily for the congestion.  The saline is fine to continue as well.    We'll try the 50mg of sertraline.  I would not expect too much by way of side effects.  Might be 4-6 weeks for full effect.  Just let me know what you think.    You can reach me through daPulse or our  line [phone]          Follow-ups after your visit        Your next 10 appointments already scheduled     Dec 12, 2017 10:30 AM CST   Evaluation 90 with Alyssa Goldsmith OT   [facility] NEUROSPECIALTIES ([facility] Clinics)    [address] [address] [phone]              Who to contact     Normal or non-critical lab and imaging results will be communicated to you by [facility] EHRhart, letter or phone within 4 business days after the clinic has received the results. If you do not hear from us within 7 days, please contact the clinic through Shiput or phone. If you have a critical or abnormal lab result, we will notify you by phone as soon as possible.  Submit refill requests through daPulse or call your pharmacy and they will forward the refill request to us. Please allow 3 business days for your refill to be completed.          If you need to speak with a  for additional information , please call: [phone]           Additional Information About Your Visit        daPulse Information     daPulse gives you secure access to your electronic health record. If you see a primary care provider, you can also send messages to your care team and make appointments. If you have  "questions, please call your primary care clinic.  If you do not have a primary care provider, please call [phone] and they will assist you.        Care EveryWhere ID     This is your Care EveryWhere ID. This could be used by other organizations to access your [place] medical records  [id number]        Your Vitals Were     Pulse Temperature Height BMI (Body Mass Index)          64 98.2  F (36.8  C) (Tympanic) 6' 1\" (1.854 m) 24.49 kg/m2         Blood Pressure from Last 3 Encounters:   12/04/17 102/64   10/31/17 120/80   07/10/17 118/69    Weight from Last 3 Encounters:   12/04/17 185 lb 9.6 oz (84.2 kg)   10/31/17 183 lb (83 kg)   07/05/17 175 lb (79.4 kg)              Today, you had the following     No orders found for display         Today's Medication Changes          These changes are accurate as of: 12/4/17  4:46 PM.  If you have any questions, ask your nurse or doctor.               These medicines have changed or have updated prescriptions.        Dose/Directions    sertraline 50 MG tablet   Commonly known as:  ZOLOFT   This may have changed:  how much to take   Used for:  Major depression in complete remission (H)   Changed by:  Vanessa Lewis DO        Dose:  50 mg   Take 1 tablet (50 mg) by mouth daily   Quantity:  90 tablet   Refills:  1            Where to get your medicines      These medications were sent to [address]  [address] [place] [address]     Phone:  [phone]     sertraline 50 MG tablet                Primary Care Provider Office Phone # Fax #    Vanessa Lewis [phone] [address] [place] [address]        Equal Access to Services     LOIS CRESPO : Thu Alex, waaxda luqadaha, qaybta kaalmada adesungyadeandra, allie tee. So [facility] [phone].    ATENCIÓN: Si habla español, tiene a brown disposición servicios gratuitos de " asistencia lingüística. Ida al [phone].    We comply with applicable federal civil rights laws and [place] laws. We do not discriminate on the basis of race, color, national origin, age, disability, sex, sexual orientation, or gender identity.            Thank you!     Thank you for choosing [facility]  for your care. Our goal is always to provide you with excellent care. Hearing back from our patients is one way we can continue to improve our services. Please take a few minutes to complete the written survey that you may receive in the mail after your visit with us. Thank you!             Your Updated Medication List - Protect others around you: Learn how to safely use, store and throw away your medicines at www.disposemymeds.org.          This list is accurate as of: 12/4/17  4:46 PM.  Always use your most recent med list.                   Brand Name Dispense Instructions for use Diagnosis    DAILY MULTIVITAMIN PO      Take 1 tablet by mouth daily.        donepezil 10 MG tablet    ARICEPT    90 tablet    Take 1 tablet (10 mg) by mouth daily    Early onset Alzheimer's dementia without behavioral disturbance       ibuprofen 200 MG capsule      Take 600 mg by mouth every 4 hours as needed.        memantine 10 MG tablet    NAMENDA    180 tablet    Take 1 tablet (10 mg) by mouth 2 times daily    Early onset Alzheimer's dementia without behavioral disturbance       sertraline 50 MG tablet    ZOLOFT    90 tablet    Take 1 tablet (50 mg) by mouth daily    Major depression in complete remission (H)       Vitamin E 200 UNITS Tabs      Take 400 Units by mouth 2 times daily

## 2017-12-04 NOTE — NURSING NOTE
"Initial /64  Pulse 64  Temp 98.2  F (36.8  C) (Tympanic)  Ht 6' 1\" (1.854 m)  Wt 185 lb 9.6 oz (84.2 kg)  BMI 24.49 kg/m2 Estimated body mass index is 24.49 kg/(m^2) as calculated from the following:    Height as of this encounter: 6' 1\" (1.854 m).    Weight as of this encounter: 185 lb 9.6 oz (84.2 kg). .      "

## 2017-12-04 NOTE — PROGRESS NOTES
SUBJECTIVE:   Jose Cruz Jameson is a 67 year old male who presents to clinic today for the following health issues:    ENT Symptoms             Symptoms: cc Present Absent Comment   Fever/Chills   x    Fatigue   x    Muscle Aches   x    Eye Irritation   x    Sneezing   x    Nasal Raymond/Drg x x     Sinus Pressure/Pain   x    Loss of smell   x    Dental pain   x    Sore Throat   x    Swollen Glands   x    Ear Pain/Fullness  x  C/o left ear symptoms this morning   Cough   x    Wheeze   x    Chest Pain   x    Shortness of breath   x    Rash   x    Other   x      Symptom duration:  1 month   Symptom severity:  mild   Treatments tried:  Afrin, nasal saline   Contacts:  none     No significant nasal discharge but complains of stuffiness, especially at night.  Wants to blow his nose frequently.  Had used afrin for a period of time before she realized she should not have him use it regularly.  Has been off now for some time.  Pt's wife feels it is more anxiety related.      Sleep has continued to be off due to changes in schedule.  They had a death in the family which has meant more travel and issues maintaining the regular routine.    * discuss increasing zoloft  Wondering if this might help with things like the nasal congestion concern and sleep issues.      Problem list and histories reviewed & adjusted, as indicated.  Additional history: as documented    Reviewed and updated as needed this visit by clinical staffTobacco  Allergies  Meds  Med Hx  Surg Hx  Fam Hx  Soc Hx      Reviewed and updated as needed this visit by Provider  Tobacco  Med Hx  Surg Hx  Fam Hx  Soc Hx        ROS: Remainder of Constitutional, CV, Respiratory, GI,  negative with exception of that mentioned above    PE:  VS as above   Gen:  WN/WD/WH male in NAD   HEENT:  NC/AT, conjunctiva wnl, TM's wnl elvis pearly gray with good light reflex, oropharynx clear without exudate/erythema   Neck:  supple, no LAD appreciated    A?P:      ICD-10-CM    1.  Early onset Alzheimer's dementia without behavioral disturbance G30.0     F02.80    2. Major depression in complete remission (H) F32.5 sertraline (ZOLOFT) 50 MG tablet   3. Nasal congestion R09.81        Patient Instructions   You can try the flonase 2 sprays in each nostril once daily for the congestion.  The saline is fine to continue as well.    We'll try the 50mg of sertraline.  I would not expect too much by way of side effects.  Might be 4-6 weeks for full effect.  Just let me know what you think.    You can reach me through ShotSpotter or our  line [phone]

## 2017-12-04 NOTE — PATIENT INSTRUCTIONS
You can try the flonase 2 sprays in each nostril once daily for the congestion.  The saline is fine to continue as well.    We'll try the 50mg of sertraline.  I would not expect too much by way of side effects.  Might be 4-6 weeks for full effect.  Just let me know what you think.    You can reach me through [employer] or our  line [phone]

## 2017-12-06 ENCOUNTER — TELEPHONE (OUTPATIENT)
Dept: FAMILY MEDICINE | Facility: CLINIC | Age: 67
End: 2017-12-06

## 2017-12-06 NOTE — TELEPHONE ENCOUNTER
Pt spouse requesting letter for work excusing her absence 12/12 as she is taking patient to appt at [facility]. Pt will  at  preferrably before appt.    Alessandra Hi, [place]

## 2017-12-06 NOTE — LETTER
To whom it may concern,       Please excuse Lay from work 12/12/17 for a medical appointment.      Sincerely,       Dr. Vanessa Lewis, DO

## 2017-12-11 ENCOUNTER — TELEPHONE (OUTPATIENT)
Dept: NEUROLOGY | Facility: CLINIC | Age: 67
End: 2017-12-11

## 2017-12-11 DIAGNOSIS — G30.0 DEMENTIA IN ALZHEIMER'S DISEASE WITH EARLY ONSET WITHOUT BEHAVIORAL DISTURBANCE (H): Primary | ICD-10-CM

## 2017-12-11 DIAGNOSIS — F02.80 DEMENTIA IN ALZHEIMER'S DISEASE WITH EARLY ONSET WITHOUT BEHAVIORAL DISTURBANCE (H): Primary | ICD-10-CM

## 2017-12-12 ENCOUNTER — THERAPY VISIT (OUTPATIENT)
Dept: NEUROLOGY | Facility: CLINIC | Age: 67
End: 2017-12-12

## 2017-12-12 DIAGNOSIS — F02.80 DEMENTIA IN ALZHEIMER'S DISEASE WITH EARLY ONSET WITHOUT BEHAVIORAL DISTURBANCE (H): ICD-10-CM

## 2017-12-12 DIAGNOSIS — G30.0 DEMENTIA IN ALZHEIMER'S DISEASE WITH EARLY ONSET WITHOUT BEHAVIORAL DISTURBANCE (H): ICD-10-CM

## 2017-12-12 NOTE — PROGRESS NOTES
12/12/17 1000   Quick Adds   Type of Visit Initial Outpatient Occupational Therapy Evaluation   General Information   Start Of Care Date 11/08/16   Referring Physician Dr. Yaw Wills   Orders Evaluate and treat as indicated   Other Orders Cognitive Performance Test   Onset of Illness/Injury or Date of Surgery 09/11/12   Precautions/Limitations No known precautions/limitations   Surgical/Medical History Reviewed Yes   Additional Occupational Profile Info/Pertinent History of Current Problem Client was first seen in the [facility] 9/11/2012.   Comments/Observations Client arrived to this appointment again with his wife Lay.   Role/Living Environment   Current Community Support Family/friend caregiver   Patient role/Employment history Retired   Community/Avocational Activities Jose Cruz is no longer a member at the [facility] (d/t Lay was unable to commit to going with him consistently as he needed).  When client rides his bicycle during summer/fall he now rides with Lay rather than alone.  Client reportedly still reads the newspaper and watches the news but his wife admitted that he more now reads just the headlines.  About a month and a half ago Lay started having client go to an adult day program on the days she works (during the week and on the weekends).  Client's siblings come every other weekend when Lay works.  This changed when client started wandering over to the neighbor's house during the day.  Client stated he feels like he could do more and often feels like a burdon to his wife.   Current Living Environment House  (Just the two of them with daughter visiting on weekends prn.)   Role/Living Environment Comments Client's wife still works 6 days per week (about 48 hours per pay period) and has client go to an Adult Day Program while she is gone now to better accommodate his need for 24-hour care.   Pain   Patient currently in pain No   Fall Risk Screen   Fall screen completed by OT   Have you  "fallen 2 or more times in the past year? No   Have you fallen and had an injury in the past year? No   Is patient a fall risk? No  (Noted changes)   Fall screen comments Client is ambulating more slowly than even one year ago and seems less steady on his feet.   Cognitive Status Examination   Orientation Person   Level of Consciousness Alert   Follows Commands and Answers Questions 100% of the time   Personal Safety and Judgment Impaired   Memory Impaired   Memory Comments Client is still not able to recognize any cognitive challenges he is currently facing.  Lay shared that the client repeats more than a year ago, is reading less (maybe just the headlines as previously mentioned), struggles more with writing (Wife feels his skills are now more \"rudamentary\"), still relies more on notes and calendars (but it has become more difficult for him to differentiate details), and misplacing items more than one year ago.  Lay now needs to help client get dressed and ready for the day before she leaves for work.  She also reported that he moves more slowly and takes more time to do things (as noticed here today).  It was also noted that client had more difficulty articulating his thoughts and demonstrated word-finding difficulties during this appt.   Organization/Problem Solving Sequencing impaired;Problem solving impaired;Categorization of information impaired;Prioritizing of information/tasks impaired;Reports problems with problem solving during evaluation   Executive Function Initiation impaired;Working memory impaired, decreased storage of information for performing tasks;Cognitive flexibility impaired;Planning ability impaired   Cognitive Comment Client needed frequent repeat of directions to be able to work through each of the 7 CPT test tasks and often became increasingly frustrated when challenged.   Visual Perception   Visual Perception Wears glasses   Balance   Balance Comments continue to monitor "   Functional Mobility   Ambulation WFL   Functional Mobility Comments Ambulates more slowly and with a less steady gait than one year ago.   Activity Tolerance   Activity Tolerance good   Instrumental Activities of Daily Living Assessment   IADL Assessment/Observations Client was unable to complete the intake form himself and relied on Lay to complete it for him today.  When given the opportunity to verbally rate himself, he generally rated himself much higher than what he is actually capable of doing.  Client no longer drives and discontinued driving about 12 months ago.  Lay continues to set up and administer all client's medications (simple routine).  Lay continues to handle all personal finances.  Jose Cruz no longer has his own debit card.  Jose Cruz is no longer able to prepare a light snack or a sandwich or use the microwave.  He is reportedly able to assist with meal preparation with continual supervision.  Jose Cruz is no longer doing any of the yard work.  As previously stated, Lay now assists Jose Cruz with dressing tasks by choosing his clothes and giving continuous cueing and prompts.  Client feeds self with reminders to come to a meal and/or serve self portions.  He bathes/showers with cueing at intervals and/or supplies provided to him during the task.  He is reportedly able to maintain his oral hygiene w/o assistance.   Planned Therapy Interventions   Planned Therapy Interventions IADL training;ADL training;Cognitive performance testing  (same day)   OT Goal 1   Goal Description Client and his wife will verbalize comprehension of today's educational strategies.  This goal was met with encouragement from Lay.  She is encouraged to have some new resources and strategies to implement.  Client did not state any specific goals at this time due to reduced insight and did get a little teary eyed at one point, reflecting on how he feels like he is more of a burdon to his wife.   Clinical Impression    Criteria for Skilled Therapeutic Interventions Met Yes, treatment indicated   OT Diagnosis moderate cognitive impairment   Influenced by the following impairments decreased STM, increased confusion   Assessment of Occupational Performance 5 or more Performance Deficits   Identified Performance Deficits Client continues to require 24-hour supervision for personal safety.   Clinical Decision Making (Complexity) High complexity   Rehab Potential good, to achieve stated therapy goals   Therapy Frequency 1-2 sessions   Risks and Benefits of Treatment have been explained. Yes   Patient, Family & other staff in agreement with plan of care Yes   Clinical Impression Comments Client and family would benefit from additional (more comprehensive) skilled Occupational Therapy services to follow-up on today's educational instruction and also to assess needed modifications and/or progressions to these newly learned strategies in order to allow client/family to increase safety awareness and independence within client's current cognitive limitations.   Education Assessment   Barriers To Learning Emotional;Cognitive   Preferred Learning Style Listening;Demonstration   Total Evaluation Time   Total Evaluation Time 30   Current Assistive Devices   Patient Currently in Pain No

## 2017-12-12 NOTE — MR AVS SNAPSHOT
After Visit Summary   12/12/2017    Jose Cruz Jameson    MRN: [id number]           Patient Information     Date Of Birth          1950        Visit Information        Provider Department      12/12/2017 10:30 AM Alyssa Goldsmith OT [facility] NEUROSPECIALTIES        Today's Diagnoses     Dementia in Alzheimer's disease with early onset without behavioral disturbance           Follow-ups after your visit        Your next 10 appointments already scheduled     May 18, 2018  1:30 PM CDT   RETURN EXTENDED with Villa Snell MD   [facility] NEUROSPECIALTIES ([facility]ate Clinics)    [address]  [address] [place] [address]   [phone]              Who to contact     Please call your clinic at [phone] to:    Ask questions about your health    Make or cancel appointments    Discuss your medicines    Learn about your test results    Speak to your doctor   If you have compliments or concerns about an experience at your clinic, or if you wish to file a complaint, please contact [facility] Patient Relations at [phone] or email us at [email or web address]         Additional Information About Your Visit        MyChart Information     Rockbott gives you secure access to your electronic health record. If you see a primary care provider, you can also send messages to your care team and make appointments. If you have questions, please call your primary care clinic.  If you do not have a primary care provider, please call [phone] and they will assist you.      Rockbott is an electronic gateway that provides easy, online access to your medical records. With [employer], you can request a clinic appointment, read your test results, renew a prescription or communicate with your care team.     To access your existing account, please contact your [facility] or call [phone] for assistance.        Care EveryWhere ID     This is  your Care EveryWhere ID. This could be used by other organizations to access your [place] medical records  [id number]         Blood Pressure from Last 3 Encounters:   12/04/17 102/64   10/31/17 120/80   07/10/17 118/69    Weight from Last 3 Encounters:   12/04/17 185 lb 9.6 oz (84.2 kg)   10/31/17 183 lb (83 kg)   07/05/17 175 lb (79.4 kg)              We Performed the Following     OCCUPATIONAL THERAPY REFERRAL        Primary Care Provider Office Phone # Fax #    Vanessa Peters DO [place] [phone] [address] [place] [address]        Equal Access to Services     LOIS CRESPO : Hadii onel Alex, wajoseda mike, qaybta kaalmada adetito, allie tee. So [facility] [phone].    ATENCIÓN: Si habla español, tiene a brown disposición servicios gratuitos de asistencia lingüística. Llame al [phone].    We comply with applicable federal civil rights laws and [place] laws. We do not discriminate on the basis of race, color, national origin, age, disability, sex, sexual orientation, or gender identity.            Thank you!     Thank you for choosing [facility] NEUROSPECIALTIES  for your care. Our goal is always to provide you with excellent care. Hearing back from our patients is one way we can continue to improve our services. Please take a few minutes to complete the written survey that you may receive in the mail after your visit with us. Thank you!             Your Updated Medication List - Protect others around you: Learn how to safely use, store and throw away your medicines at www.disposemymeds.org.          This list is accurate as of: 12/12/17 12:15 PM.  Always use your most recent med list.                   Brand Name Dispense Instructions for use Diagnosis    DAILY MULTIVITAMIN PO      Take 1 tablet by mouth daily.        donepezil 10 MG tablet    ARICEPT    90 tablet    Take 1 tablet (10 mg) by mouth daily    Early onset Alzheimer's dementia  without behavioral disturbance       ibuprofen 200 MG capsule      Take 600 mg by mouth every 4 hours as needed.        memantine 10 MG tablet    NAMENDA    180 tablet    Take 1 tablet (10 mg) by mouth 2 times daily    Early onset Alzheimer's dementia without behavioral disturbance       sertraline 50 MG tablet    ZOLOFT    90 tablet    Take 1 tablet (50 mg) by mouth daily    Major depression in complete remission (H)       Vitamin E 200 UNITS Tabs      Take 400 Units by mouth 2 times daily

## 2017-12-12 NOTE — PROGRESS NOTES
12/12/17 1000   Signing Clinician's Name / Credentials   Signing clinician's name / credentials Alyssa Goldsmith, OTR/L   OT Goal 1   Goal Description   Client and his wife will verbalize comprehension of today's educational strategies. This goal was met with encouragement from Lay. She is encouraged to have some new resources and strategies to implement. Client did not state any specific goals at this time due to reduced insight and did get a little teary eyed at one point, reflecting on how he feels like he is more of a burdon to his wife.    Therapeutic Interventions   Therapeutic Interventions Self Care/Home Management   Self Care/Home Management   Minutes 15 Minutes   Skilled Intervention Client and his wife, Lay, were provided with new written caregiving materials for level 3.5 and verbal instruction in specific therapeutic interventions more relevant to where they are currently at this point:  1.) Educated in continuing to consistently provide 24-hour supervision options either at home or in a facility that could provide this for him (brissa. w/meds., transportation, biking, and meals).  Lay continues to set up and administering client's meds. twice daily.  2.) benefits of continued involvement in physical and cognitively stimulating activities (reading, puzzles, word/number games), as well as social, and emotional support; 3.) benefits of maintaining a structured, yet simple, daily routine to encourage success with daily activities; 4.) educated in continued benefits of regular involvement in social activities and/or a structured adult day program.   Assessments Completed   Assessments Completed OT Eval. and Cognitive Performance Test   Education   Learner Patient;Significant other   Readiness Acceptance   Method Booklet/handout;Literature;Explanation   Response Needs reinforcement;Verbalizes understanding  (Mostly wife's response.  Client did express appreciation.)   Communication with other  professionals   Communication with other professionals via EPIC   Plan   Plan for next session Client (and family) would benefit from follow-up Cognitive Performance Testing after December 12, 2018 to ensure that the client's most immediate needs are continually being met.   Total Session Time   Timed Code Treatment Minutes 15   Total Treatment Time (sum of timed and untimed services) 95   AMBULATORY CLINICS ONLY-MEDICAL AND TREATMENT DIAGNOSIS   Medical Diagnosis Major neurocognitive disorder d/t Alzheimer's disease of moderate severity   OT Diagnosis moderate cognitive impairment

## 2017-12-12 NOTE — PROGRESS NOTES
[facility] [place] [facility]  71119 (30 minutes) - OT Eval.  61838 (50 minutes including documentation) - CPT  16674 (15 minutes) - OT Tx  TOTAL = 95 minutes  OT Dx - Moderate cognitive impairment    OCCUPATIONAL THERAPY COGNITIVE PERFORMANCE EVALUATION  [facility] [place]  Cognitive Performance Test    SUMMARY OF TEST:    The Cognitive Performance Test (CPT) is a standardized performance-based assessment to measure working memory/executive function processing capacities that underlie functional performance. Subtasks include common basic and instrumental activities of daily living (ADL/IADL) which are rated based on the manner in which patients respond to task demands of varying complexity. The total CPT score describes a level of functioning that indicates how information is processed, implications for functional activities, potential safety risks and a recommended level of supervision or assist based on cognitive function. The highest total score on this test is in the range of 5.6 to 5.8.    DATE OF TESTIN2017 - Client was last tested on 2016 and scored 4.14/5.6 (see test score comparisons below).    RESULTS OF TESTING:  Client required a lot more repeating of instructions throughout each testing task in order to be able to complete them.  He became increasingly frustrated with each challenge during testing and needed increased encouragement to continue.                                                                                         CPT Subtest Results    MEDBOX: 3.5/6 (down from 4.0) SHOP/GLOVES: 3.5/6 (same) PHONE: 3.5/6 (same)   WASH:  3.5/5 (down from 5.0) TRAVEL: 4.0/6 (down from 5.0) TOAST: 3.5/5 (down from 4.0)   DRESS: 4.0/5 (same)   TOTAL CPT SCORE:  25.5/39     Average CPT Score  3.64/5.6    INTERPRETATION OF TEST RESULTS:    Based on the Cognitive Performance Test, this patient scored at CPT Level 3.64.  See CPT Levels reference below.    Summary of functional cognitive  status:   It appears that the client's wife has put multiple measures in place to help ensure client is receiving the additional supervision and assistance he now requires for all daily tasks.  Continued 24-hour care/supervision is required with all IADLs and ADLs as well as monitoring of any hazardous activities at home (brissa. Medications, cooking tasks, yard maintenance).  Overall, he appear to function at level 3.64 CPT behaviors (or lower) in his daily IADL and ADL activities.  His performance today indicates a high need for continued increased assistance in all IADL and ADL areas.  Recommended 24-hour supervision either at home or in a facility that could readily provide this for him for adequate safety with all daily tasks d/t his increased vulnerability.  Spouse was encouraged to ensure client is provided with this additional support to better monitor daily activities to ensure continued safety and adequacy of task performance in all IADL and ADL areas.    Factors affecting performance:  Possible speech/language impairment  Impaired mobility   Emotional Status    Recommendations:    Supervision in living settin hour                                                       TIME ADMINISTERING TEST: 30 minutes    TIME FOR INTERPRETATION AND PREPARATION OF REPORT: 20 minutes    TOTAL TIME: 50 minutes    CPT Levels Reference:    Patient's Average CPT Score:  3.64                                                                                                                                                  Individual scores range along a continuum as outlined below.  In addition to cognitive status, other factors may affect safety in a home environment.  Please refer to specific recommendations for this patient.    ___5.6-5.8  Normal functioning (absence of cognitive-functional disability).  Independent in managing personal affairs, monitors and directs own behavior.  Uses complex information to carry out daily  "activities with safety and accuracy.    Proficient with instrumental activities of daily living (IADL) and learning new activity.  Problems are anticipated, errors are avoided, and consequences of actions are considered.      ___5.0   Mild cognitive-functional disability; deficits in working memory and executive thought processes. Difficulty using complex information. Problems may be observed with recent memory, judgment, reasoning and planning ahead. May be impulsive or have difficulty anticipating consequences.  Safety:  May require assistance to plan ahead; or to manage complex medication schedules, appointments or finances.  Hazardous activities may need to be monitored or limited.  ADL:  Mild functional decline.  Able to complete basic self-care and routine household tasks.  May have difficulty with complex daily tasks such as reading, writing, meal preparation, shopping or driving.   Learns through hands on teaching. Self-centered behavior or difficulty considering the needs of others may be seen related to trouble seeing the  whole picture\". Can appear disorganized or uninhibited.    ___4.5  Mild to moderate cognitive-functional disability. Significant deficits in working memory and executive thought processes. Judgment, reasoning and planning show obvious impairment.  Distractible with inability to shift attention/actions given competing stimuli.  Difficulty with problem solving and managing details. Complex daily tasks performed with inconsistency, difficulty, or error.     Safety:  Medications should be monitored, stove use may require supervision, and driving ability may be affected.  Impaired safety awareness with inability to anticipate potential problems.  May not recognize or respond to emergent situations. Requires frequent check-in support.   ADL:  Mild difficulty with simple everyday self-care tasks. Benefits from structured, routine activity.  Will likely need reminders to complete tasks outside " of the routine. Requires assistance with planning and IADL tasks like shopping and finances. Learns concrete tasks through repetition, but performance may not generalize. Tends to be impulsive with poor insight. Self centered behavior or inability to consider the needs of others is common.    ___4.0  Moderate cognitive-functional disability; abstract to concrete thought processes. Working memory and executive function impairments are obvious. Difficulty with planning and problem solving.  Behavior is goal-directed, but unable to follow multi-step directions, is easily distracted, and may not recognize mistakes.  Inability to anticipate hazards or understand precautions.  Safety:  Recommend 24-hour supervision for safety. Supervision needed for medication management and for hazardous activities. May not be able to follow a restricted diet. Can get lost in unfamiliar surroundings. Generally, persons functioning at level 4 should not be driving.   ADL:  Some decline in quality or frequency of ADL.  [place] enhanced by use of a routine, simple concrete directions, and caregiver set-up of needed items. Complex tasks such as money or home management typically requires assistance.  Relies heavily on vision to guide behavior; will ignore objects/hazards not in plain sight and can be distracted by irrelevant objects. Often has poor insight.  Able to carry out social conversation and may verbally  cover  for deficits leading caregivers to believe they are capable of functioning independently.       _X_3.5  Moderate cognitive-functional disability; increased cues needed for task completion. Aware of concrete task steps but needs prompting or cues to initiate and complete simple tasks. Attention span is limited, simple directions may need to be repeated, and re-focus to a topic or task may be required.  Safety:  24-hour supervision required for safety and for assistance with daily tasks. Assistance required with  medications, and access to medication should be limited. Meals, nutrition and dietary restrictions need to be monitored.  All hazardous activities should be restricted or supervised. Should not drive. Prone to wandering and can become lost.  ADL:  Moderate functional decline. Familiar tasks usually requires set-up of supplies and directions to complete steps. May need objects handed to them for task initiation. Function best with a set schedule in familiar surroundings with familiar people. All complex tasks must be done by others. Vocabulary is diminished and speech often unfocused.           Physician Attestation   I agree with the information in this note.    Villa Snell

## 2018-01-22 ENCOUNTER — DOCUMENTATION ONLY (OUTPATIENT)
Dept: VASCULAR SURGERY | Facility: CLINIC | Age: 68
End: 2018-01-22

## 2018-01-22 DIAGNOSIS — Z13.6 ENCOUNTER FOR ABDOMINAL AORTIC ANEURYSM (AAA) SCREENING: Primary | ICD-10-CM

## 2018-04-02 ENCOUNTER — TELEPHONE (OUTPATIENT)
Dept: FAMILY MEDICINE | Facility: CLINIC | Age: 68
End: 2018-04-02

## 2018-04-02 ENCOUNTER — APPOINTMENT (OUTPATIENT)
Dept: GENERAL RADIOLOGY | Facility: CLINIC | Age: 68
End: 2018-04-02
Attending: EMERGENCY MEDICINE
Payer: COMMERCIAL

## 2018-04-02 ENCOUNTER — HOSPITAL ENCOUNTER (EMERGENCY)
Facility: CLINIC | Age: 68
Discharge: HOME | End: 2018-04-02
Attending: EMERGENCY MEDICINE | Admitting: EMERGENCY MEDICINE
Payer: COMMERCIAL

## 2018-04-02 VITALS
RESPIRATION RATE: 18 BRPM | HEART RATE: 64 BPM | BODY MASS INDEX: 24.57 KG/M2 | SYSTOLIC BLOOD PRESSURE: 111 MMHG | TEMPERATURE: 97.1 F | WEIGHT: 186.25 LBS | OXYGEN SATURATION: 100 % | DIASTOLIC BLOOD PRESSURE: 93 MMHG

## 2018-04-02 DIAGNOSIS — R07.9 CHEST PAIN, UNSPECIFIED TYPE: Primary | ICD-10-CM

## 2018-04-02 DIAGNOSIS — R07.9 ACUTE CHEST PAIN: ICD-10-CM

## 2018-04-02 LAB
ALBUMIN SERPL-MCNC: 3.6 G/DL (ref 3.4–5)
ALP SERPL-CCNC: 87 U/L (ref 40–150)
ALT SERPL W P-5'-P-CCNC: 16 U/L (ref 0–70)
ANION GAP SERPL CALCULATED.3IONS-SCNC: 8 MMOL/L (ref 3–14)
AST SERPL W P-5'-P-CCNC: 22 U/L (ref 0–45)
BASOPHILS # BLD AUTO: 0.1 10E9/L (ref 0–0.2)
BASOPHILS NFR BLD AUTO: 0.5 %
BILIRUB SERPL-MCNC: 0.6 MG/DL (ref 0.2–1.3)
BUN SERPL-MCNC: 13 MG/DL (ref 7–30)
CALCIUM SERPL-MCNC: 8.9 MG/DL (ref 8.5–10.1)
CHLORIDE SERPL-SCNC: 104 MMOL/L (ref 94–109)
CO2 SERPL-SCNC: 24 MMOL/L (ref 20–32)
CREAT SERPL-MCNC: 0.85 MG/DL (ref 0.66–1.25)
DIFFERENTIAL METHOD BLD: ABNORMAL
EOSINOPHIL # BLD AUTO: 0.1 10E9/L (ref 0–0.7)
EOSINOPHIL NFR BLD AUTO: 0.7 %
ERYTHROCYTE [DISTWIDTH] IN BLOOD BY AUTOMATED COUNT: 13.2 % (ref 10–15)
GFR SERPL CREATININE-BSD FRML MDRD: >90 ML/MIN/1.7M2
GLUCOSE SERPL-MCNC: 86 MG/DL (ref 70–99)
HCT VFR BLD AUTO: 38.8 % (ref 40–53)
HGB BLD-MCNC: 13.6 G/DL (ref 13.3–17.7)
IMM GRANULOCYTES # BLD: 0 10E9/L (ref 0–0.4)
IMM GRANULOCYTES NFR BLD: 0.2 %
LIPASE SERPL-CCNC: 212 U/L (ref 73–393)
LYMPHOCYTES # BLD AUTO: 1.4 10E9/L (ref 0.8–5.3)
LYMPHOCYTES NFR BLD AUTO: 11.2 %
MCH RBC QN AUTO: 32.2 PG (ref 26.5–33)
MCHC RBC AUTO-ENTMCNC: 35.1 G/DL (ref 31.5–36.5)
MCV RBC AUTO: 92 FL (ref 78–100)
MONOCYTES # BLD AUTO: 1.3 10E9/L (ref 0–1.3)
MONOCYTES NFR BLD AUTO: 9.8 %
NEUTROPHILS # BLD AUTO: 10 10E9/L (ref 1.6–8.3)
NEUTROPHILS NFR BLD AUTO: 77.6 %
NRBC # BLD AUTO: 0 10*3/UL
NRBC BLD AUTO-RTO: 0 /100
PLATELET # BLD AUTO: 250 10E9/L (ref 150–450)
POTASSIUM SERPL-SCNC: 3.6 MMOL/L (ref 3.4–5.3)
PROT SERPL-MCNC: 7.3 G/DL (ref 6.8–8.8)
RBC # BLD AUTO: 4.23 10E12/L (ref 4.4–5.9)
SODIUM SERPL-SCNC: 136 MMOL/L (ref 133–144)
TROPONIN I BLD-MCNC: 0 UG/L (ref 0–0.1)
TROPONIN I SERPL-MCNC: <0.015 UG/L (ref 0–0.04)
TROPONIN I SERPL-MCNC: <0.015 UG/L (ref 0–0.04)
WBC # BLD AUTO: 12.9 10E9/L (ref 4–11)

## 2018-04-02 PROCEDURE — 80053 COMPREHEN METABOLIC PANEL: CPT | Performed by: EMERGENCY MEDICINE

## 2018-04-02 PROCEDURE — 85025 COMPLETE CBC W/AUTO DIFF WBC: CPT | Performed by: EMERGENCY MEDICINE

## 2018-04-02 PROCEDURE — 93005 ELECTROCARDIOGRAM TRACING: CPT | Performed by: EMERGENCY MEDICINE

## 2018-04-02 PROCEDURE — 71046 X-RAY EXAM CHEST 2 VIEWS: CPT

## 2018-04-02 PROCEDURE — 93010 ELECTROCARDIOGRAM REPORT: CPT | Mod: Z6 | Performed by: EMERGENCY MEDICINE

## 2018-04-02 PROCEDURE — 83690 ASSAY OF LIPASE: CPT | Performed by: EMERGENCY MEDICINE

## 2018-04-02 PROCEDURE — 84484 ASSAY OF TROPONIN QUANT: CPT

## 2018-04-02 PROCEDURE — 99285 EMERGENCY DEPT VISIT HI MDM: CPT | Mod: 25 | Performed by: EMERGENCY MEDICINE

## 2018-04-02 PROCEDURE — 99284 EMERGENCY DEPT VISIT MOD MDM: CPT | Mod: 25 | Performed by: EMERGENCY MEDICINE

## 2018-04-02 PROCEDURE — 84484 ASSAY OF TROPONIN QUANT: CPT | Performed by: EMERGENCY MEDICINE

## 2018-04-02 ASSESSMENT — ENCOUNTER SYMPTOMS
SHORTNESS OF BREATH: 0
COUGH: 1
FEVER: 0
CHILLS: 0

## 2018-04-02 NOTE — TELEPHONE ENCOUNTER
At about 6AM began experiencing some chest pain.  Went to the ED at [facility].  Had EKG, CXR and troponin negative x2.  They recommended an outpt stress test.  Lay is wondering abut the feasibility of this and how to go about scheduling.    Reviewed options.  Exercise has bene more difficult lately, balance is an issue.  She would prefer a medication test rather then exercise.    Order placed.  Phone number given.  She will call to schedule and they will f/u afterwards.    Vanessa Lewis

## 2018-04-02 NOTE — ED AVS SNAPSHOT
[facility], Emergency Department    [address]    [facility] [place] [place] [address]    Phone:  [phone]    Fax:  [phone]                                       Jose Cruz Jameson   MRN: [id number]    Department:  [facility], Emergency Department   Date of Visit:  4/2/2018           Patient Information     Date Of Birth          1950        Your diagnoses for this visit were:     Acute chest pain        You were seen by Megan Borrero MD.        Discharge Instructions       Please call your doctor and discuss getting an outpatient stress test.     Please return if worsening/concerns.     Today's results:  Results for orders placed or performed during the hospital encounter of 04/02/18 (from the past 24 hour(s))   CBC with platelets differential   Result Value Ref Range    WBC 12.9 (H) 4.0 - 11.0 10e9/L    RBC Count 4.23 (L) 4.4 - 5.9 10e12/L    Hemoglobin 13.6 13.3 - 17.7 g/dL    Hematocrit 38.8 (L) 40.0 - 53.0 %    MCV 92 78 - 100 fl    MCH 32.2 26.5 - 33.0 pg    MCHC 35.1 31.5 - 36.5 g/dL    RDW 13.2 10.0 - 15.0 %    Platelet Count 250 150 - 450 10e9/L    Diff Method Automated Method     % Neutrophils 77.6 %    % Lymphocytes 11.2 %    % Monocytes 9.8 %    % Eosinophils 0.7 %    % Basophils 0.5 %    % Immature Granulocytes 0.2 %    Nucleated RBCs 0 0 /100    Absolute Neutrophil 10.0 (H) 1.6 - 8.3 10e9/L    Absolute Lymphocytes 1.4 0.8 - 5.3 10e9/L    Absolute Monocytes 1.3 0.0 - 1.3 10e9/L    Absolute Eosinophils 0.1 0.0 - 0.7 10e9/L    Absolute Basophils 0.1 0.0 - 0.2 10e9/L    Abs Immature Granulocytes 0.0 0 - 0.4 10e9/L    Absolute Nucleated RBC 0.0    Comprehensive metabolic panel   Result Value Ref Range    Sodium 136 133 - 144 mmol/L    Potassium 3.6 3.4 - 5.3 mmol/L    Chloride 104 94 - 109 mmol/L    Carbon Dioxide 24 20 - 32 mmol/L    Anion Gap 8 3 - 14 mmol/L    Glucose 86 70 - 99 mg/dL    Urea Nitrogen 13 7 - 30 mg/dL    Creatinine 0.85 0.66 - 1.25 mg/dL    GFR Estimate >90 >60 mL/min/1.7m2    GFR  Estimate If Black >90 >60 mL/min/1.7m2    Calcium 8.9 8.5 - 10.1 mg/dL    Bilirubin Total 0.6 0.2 - 1.3 mg/dL    Albumin 3.6 3.4 - 5.0 g/dL    Protein Total 7.3 6.8 - 8.8 g/dL    Alkaline Phosphatase 87 40 - 150 U/L    ALT 16 0 - 70 U/L    AST 22 0 - 45 U/L   Lipase   Result Value Ref Range    Lipase 212 73 - 393 U/L   Troponin I   Result Value Ref Range    Troponin I ES <0.015 0.000 - 0.045 ug/L   Troponin POCT   Result Value Ref Range    Troponin I 0.00 0.00 - 0.10 ug/L   XR Chest 2 Views    Narrative    HISTORY: Pain.    COMPARISON: 6/17/2010.    FINDINGS: 2 views of the chest at 9:08 AM. PA view was repeated to  include the apices. Heart and pulmonary vasculature are within normal  limits. Annamaria and pleural spaces are clear. No pneumothorax. No acute  bone finding. No acute pulmonary opacity.      Impression    IMPRESSION: Stable appearance of the chest.    MIKE ACEVEDO MD   Troponin I   Result Value Ref Range    Troponin I ES <0.015 0.000 - 0.045 ug/L          *CHEST PAIN, UNCERTAIN CAUSE    Based on your exam today, the exact cause of your chest pain is not certain. Your condition does not seem serious at this time, and your pain does not appear to be coming from your heart. However, sometimes the signs of a serious problem take more time to appear. Therefore, watch for the warning signs listed below.  HOME CARE:  1. Rest today and avoid strenuous activity.  2. Take any prescribed medicine as directed.  FOLLOW UP with your doctor in 1-3 days.   GET PROMPT MEDICAL ATTENTION if any of the following occur:    A change in the type of pain: if it feels different, becomes more severe, lasts longer, or begins to spread into your shoulder, arm, neck, jaw or back    Shortness of breath or increased pain with breathing    Weakness, dizziness, or fainting    Cough with blood or dark colored sputum (phlegm)    Fever over 101  F (38.3  C)    Swelling, pain or redness in one leg    8700-3367 [employer] [address]  [place] [address]. All rights reserved. This information is not intended as a substitute for professional medical care. Always follow your healthcare professional's instructions.  This information has been modified by your health care provider with permission from the publisher.      Your next 10 appointments already scheduled     Apr 30, 2018  1:45 PM CDT   New Patient Visit with Antonio Ansari MD   [facility] ([facility])    [address]  [address] [place] [id number] [phone]              24 Hour Appointment Hotline       To make an appointment at any [facility], call [phone] ([phone]). If you don't have a family doctor or clinic, we will help you find one. [facility] clinics are conveniently located to serve the needs of you and your family.             Review of your medicines      Our records show that you are taking the medicines listed below. If these are incorrect, please call your family doctor or clinic.        Dose / Directions Last dose taken    donepezil 10 MG tablet   Commonly known as:  ARICEPT   Dose:  10 mg   Quantity:  90 tablet        Take 1 tablet (10 mg) by mouth daily   Refills:  3        memantine 10 MG tablet   Commonly known as:  NAMENDA   Dose:  10 mg   Quantity:  180 tablet        Take 1 tablet (10 mg) by mouth 2 times daily   Refills:  3        sertraline 50 MG tablet   Commonly known as:  ZOLOFT   Dose:  50 mg   Quantity:  90 tablet        Take 1 tablet (50 mg) by mouth daily   Refills:  1                Procedures and tests performed during your visit     Procedure/Test Number of Times Performed    CBC with platelets differential 1    Comprehensive metabolic panel 1    EKG 12 lead 1    Lipase 1    Troponin I 2    Troponin POCT 1    XR Chest 2 Views 1      Orders Needing Specimen Collection     None      Pending Results     No orders found from 3/31/2018 to 4/3/2018.            Pending Culture Results      No orders found from 3/31/2018 to 4/3/2018.            Pending Results Instructions     If you had any lab results that were not finalized at the time of your Discharge, you can call the ED Lab Result RN at [phone]. You will be contacted by this team for any positive Lab results or changes in treatment. The nurses are available 7 days a week from 10A to 6:30P.  You can leave a message 24 hours per day and they will return your call.        Thank you for choosing [facility]       Thank you for choosing [facility] for your care. Our goal is always to provide you with excellent care. Hearing back from our patients is one way we can continue to improve our services. Please take a few minutes to complete the written survey that you may receive in the mail after you visit with us. Thank you!        [facility] Information     [employer] gives you secure access to your electronic health record. If you see a primary care provider, you can also send messages to your care team and make appointments. If you have questions, please call your primary care clinic.  If you do not have a primary care provider, please call [phone] and they will assist you.        Care EveryWhere ID     This is your Care EveryWhere ID. This could be used by other organizations to access your [facility] medical records  [id number]        Equal Access to Services     ANNIKA CRESPO : Thu Alex, wacuba mckeon, qagalinata kaalmadeandra bragg, allie tee. So [facility] [phone].    ATENCIÓN: Si habla español, tiene a brown disposición servicios gratuitos de asistencia lingüística. Llame al [phone].    We comply with applicable federal civil rights laws and [place] laws. We do not discriminate on the basis of race, color, national origin, age, disability, sex, sexual orientation, or gender identity.            After Visit Summary       This is your record. Keep this with you and show to your community  pharmacist(s) and doctor(s) at your next visit.

## 2018-04-02 NOTE — ED AVS SNAPSHOT
[facility] [place], Emergency Department    [address]    [place] [address]    Phone:  [phone]    Fax:  [phone]                                       Jose Cruz Jameson   MRN: [id number]    Department:  [facility], Emergency Department   Date of Visit:  4/2/2018           After Visit Summary Signature Page     I have received my discharge instructions, and my questions have been answered. I have discussed any challenges I see with this plan with the nurse or doctor.    ..........................................................................................................................................  Patient/Patient Representative Signature      ..........................................................................................................................................  Patient Representative Print Name and Relationship to Patient    ..................................................               ................................................  Date                                            Time    ..........................................................................................................................................  Reviewed by Signature/Title    ...................................................              ..............................................  Date                                                            Time

## 2018-04-02 NOTE — TELEPHONE ENCOUNTER
pt's Spouse quique is calling and states that adelaida was In the ER for some chest pain today At  [facility] and everything came back negative,  But was advised a outpt stress test might be appropriate , they were told to call  Dr Lewis to get order. Miri is asking for Dr Lewis to call her back, she wants to talk to you directly. Please call to advise.     Geeta Vance, [place]

## 2018-04-02 NOTE — DISCHARGE INSTRUCTIONS
Please call your doctor and discuss getting an outpatient stress test.     Please return if worsening/concerns.     Today's results:  Results for orders placed or performed during the hospital encounter of 04/02/18 (from the past 24 hour(s))   CBC with platelets differential   Result Value Ref Range    WBC 12.9 (H) 4.0 - 11.0 10e9/L    RBC Count 4.23 (L) 4.4 - 5.9 10e12/L    Hemoglobin 13.6 13.3 - 17.7 g/dL    Hematocrit 38.8 (L) 40.0 - 53.0 %    MCV 92 78 - 100 fl    MCH 32.2 26.5 - 33.0 pg    MCHC 35.1 31.5 - 36.5 g/dL    RDW 13.2 10.0 - 15.0 %    Platelet Count 250 150 - 450 10e9/L    Diff Method Automated Method     % Neutrophils 77.6 %    % Lymphocytes 11.2 %    % Monocytes 9.8 %    % Eosinophils 0.7 %    % Basophils 0.5 %    % Immature Granulocytes 0.2 %    Nucleated RBCs 0 0 /100    Absolute Neutrophil 10.0 (H) 1.6 - 8.3 10e9/L    Absolute Lymphocytes 1.4 0.8 - 5.3 10e9/L    Absolute Monocytes 1.3 0.0 - 1.3 10e9/L    Absolute Eosinophils 0.1 0.0 - 0.7 10e9/L    Absolute Basophils 0.1 0.0 - 0.2 10e9/L    Abs Immature Granulocytes 0.0 0 - 0.4 10e9/L    Absolute Nucleated RBC 0.0    Comprehensive metabolic panel   Result Value Ref Range    Sodium 136 133 - 144 mmol/L    Potassium 3.6 3.4 - 5.3 mmol/L    Chloride 104 94 - 109 mmol/L    Carbon Dioxide 24 20 - 32 mmol/L    Anion Gap 8 3 - 14 mmol/L    Glucose 86 70 - 99 mg/dL    Urea Nitrogen 13 7 - 30 mg/dL    Creatinine 0.85 0.66 - 1.25 mg/dL    GFR Estimate >90 >60 mL/min/1.7m2    GFR Estimate If Black >90 >60 mL/min/1.7m2    Calcium 8.9 8.5 - 10.1 mg/dL    Bilirubin Total 0.6 0.2 - 1.3 mg/dL    Albumin 3.6 3.4 - 5.0 g/dL    Protein Total 7.3 6.8 - 8.8 g/dL    Alkaline Phosphatase 87 40 - 150 U/L    ALT 16 0 - 70 U/L    AST 22 0 - 45 U/L   Lipase   Result Value Ref Range    Lipase 212 73 - 393 U/L   Troponin I   Result Value Ref Range    Troponin I ES <0.015 0.000 - 0.045 ug/L   Troponin POCT   Result Value Ref Range    Troponin I 0.00 0.00 - 0.10 ug/L   XR  Chest 2 Views    Narrative    HISTORY: Pain.    COMPARISON: 6/17/2010.    FINDINGS: 2 views of the chest at 9:08 AM. PA view was repeated to  include the apices. Heart and pulmonary vasculature are within normal  limits. Annamaria and pleural spaces are clear. No pneumothorax. No acute  bone finding. No acute pulmonary opacity.      Impression    IMPRESSION: Stable appearance of the chest.    MIKE ACEVEDO MD   Troponin I   Result Value Ref Range    Troponin I ES <0.015 0.000 - 0.045 ug/L          *CHEST PAIN, UNCERTAIN CAUSE    Based on your exam today, the exact cause of your chest pain is not certain. Your condition does not seem serious at this time, and your pain does not appear to be coming from your heart. However, sometimes the signs of a serious problem take more time to appear. Therefore, watch for the warning signs listed below.  HOME CARE:  1. Rest today and avoid strenuous activity.  2. Take any prescribed medicine as directed.  FOLLOW UP with your doctor in 1-3 days.   GET PROMPT MEDICAL ATTENTION if any of the following occur:    A change in the type of pain: if it feels different, becomes more severe, lasts longer, or begins to spread into your shoulder, arm, neck, jaw or back    Shortness of breath or increased pain with breathing    Weakness, dizziness, or fainting    Cough with blood or dark colored sputum (phlegm)    Fever over 101  F (38.3  C)    Swelling, pain or redness in one leg    9754-1358 [employer]. [address] [place] [address]. All rights reserved. This information is not intended as a substitute for professional medical care. Always follow your healthcare professional's instructions.  This information has been modified by your health care provider with permission from the publisher.

## 2018-04-02 NOTE — ED PROVIDER NOTES
History     Chief Complaint   Patient presents with     Chest Pain     m id sternal chest pain that started about 1 hour ago, (7945)     HPI  Jose Cruz Jameson is a 67 year old male who presents to the ED with his wife due to chest pain which began this morning and is now improved.  She says he has Alzheimer's and has never complained of chest pain.  He does not have hx of hyperlipidemia, hypertension, diabetes.  No f/c.  Maybe a slight mild cough recently. He says he feels fine.  He has difficulty giving history.    I have reviewed the Medications, Allergies, Past Medical and Surgical History, and Social History in the Epic system.    Review of Systems   Constitutional: Negative for chills and fever.   Respiratory: Positive for cough (mild). Negative for shortness of breath.    Cardiovascular: Positive for chest pain. Negative for leg swelling.   All other systems reviewed and are negative.      Physical Exam   BP: (!) 140/99  Pulse: 58  Heart Rate: 46  Temp: 97.1  F (36.2  C)  Resp: 18  Weight: 84.5 kg (186 lb 4 oz)  SpO2: 96 %      Physical Exam   Constitutional: He is oriented to person, place, and time. He appears well-developed and well-nourished. No distress.   HENT:   Head: Normocephalic and atraumatic.   Right Ear: External ear normal.   Left Ear: External ear normal.   Nose: Nose normal.   Mouth/Throat: Oropharynx is clear and moist.   Eyes: Pupils are equal, round, and reactive to light.   Neck: Normal range of motion. Neck supple.   Cardiovascular: Normal rate, regular rhythm, normal heart sounds and intact distal pulses.    Pulmonary/Chest: Effort normal and breath sounds normal.   Abdominal: Soft. Bowel sounds are normal. He exhibits no distension and no mass. There is no tenderness. There is no rebound and no guarding.   Musculoskeletal: Normal range of motion. He exhibits no edema or tenderness.   Lymphadenopathy:     He has no cervical adenopathy.   Neurological: He is alert and oriented to person,  place, and time.   Skin: Skin is warm and dry. He is not diaphoretic.   Psychiatric: His affect is blunt.   Nursing note and vitals reviewed.      ED Course     ED Course     Procedures             EKG Interpretation:      Interpreted by Megan Borrero  Time reviewed: 7:52  Symptoms at time of EKG: None   Rhythm: sinus bradycardia  Rate: 54  Axis: Normal  Ectopy: none  Conduction: normal  ST Segments/ T Waves: No ST-T wave changes  Q Waves: none  Comparison to prior: No old EKG available    Clinical Impression: normal EKG               Labs Ordered and Resulted from Time of ED Arrival Up to the Time of Departure from the ED   CBC WITH PLATELETS DIFFERENTIAL - Abnormal; Notable for the following:        Result Value    WBC 12.9 (*)     RBC Count 4.23 (*)     Hematocrit 38.8 (*)     Absolute Neutrophil 10.0 (*)     All other components within normal limits   COMPREHENSIVE METABOLIC PANEL   LIPASE   TROPONIN I   TROPONIN I   TROPONIN POCT            Assessments & Plan (with Medical Decision Making)   The patient has hx of alzheimer's and presents with his wife due to chest pain this morning. She says he has never complained of this before.  He denies chest pain now.  Hx has difficulty given his Alzheimer's disease.  No hx of prior heart disease, hypertension, diabetes, high cholesterol. Diff dx includes pneumonia, musculoskektal, pancreatitis, ACS, costochondritis. ECG was done and shows no acute process.  Heart rate on ECG is 54.  Labs were ordered and reviewed. His white count is 12.9.  Comp met is normal.  Lipase normal.  Serial troponins x 2 are normal. CXR was done and is stable without acute infiltrate. Sats are 100% on room air.  He appears comfortable and denies symptoms now.  I discussed options with his wife.  He will be d/c home.  She will discuss getting an outpatient stress test with their doctor. Cause unclear.     I have reviewed the nursing notes.    I have reviewed the findings, diagnosis, plan and need  for follow up with the patient.    Discharge Medication List as of 4/2/2018  1:53 PM          Final diagnoses:   Acute chest pain       4/2/2018   [facility], [place], EMERGENCY DEPARTMENT     Megan Borrero MD  06/03/18 1009

## 2018-04-03 LAB — INTERPRETATION ECG - MUSE: NORMAL

## 2018-04-10 ENCOUNTER — HOSPITAL ENCOUNTER (OUTPATIENT)
Dept: CARDIOLOGY | Facility: CLINIC | Age: 68
Discharge: HOME | End: 2018-04-10
Attending: FAMILY MEDICINE | Admitting: FAMILY MEDICINE
Payer: COMMERCIAL

## 2018-04-10 ENCOUNTER — HOSPITAL ENCOUNTER (OUTPATIENT)
Dept: NUCLEAR MEDICINE | Facility: CLINIC | Age: 68
Setting detail: NUCLEAR MEDICINE
End: 2018-04-10
Attending: FAMILY MEDICINE
Payer: COMMERCIAL

## 2018-04-10 DIAGNOSIS — R07.9 CHEST PAIN, UNSPECIFIED TYPE: ICD-10-CM

## 2018-04-10 PROCEDURE — 34300033 ZZH RX 343: Performed by: FAMILY MEDICINE

## 2018-04-10 PROCEDURE — 93018 CV STRESS TEST I&R ONLY: CPT | Performed by: INTERNAL MEDICINE

## 2018-04-10 PROCEDURE — 78452 HT MUSCLE IMAGE SPECT MULT: CPT | Mod: 26 | Performed by: INTERNAL MEDICINE

## 2018-04-10 PROCEDURE — 25000128 H RX IP 250 OP 636: Performed by: INTERNAL MEDICINE

## 2018-04-10 PROCEDURE — A9502 TC99M TETROFOSMIN: HCPCS | Performed by: FAMILY MEDICINE

## 2018-04-10 PROCEDURE — 93017 CV STRESS TEST TRACING ONLY: CPT

## 2018-04-10 PROCEDURE — 93016 CV STRESS TEST SUPVJ ONLY: CPT | Performed by: INTERNAL MEDICINE

## 2018-04-10 PROCEDURE — 78452 HT MUSCLE IMAGE SPECT MULT: CPT

## 2018-04-10 RX ORDER — AMINOPHYLLINE 25 MG/ML
50-100 INJECTION, SOLUTION INTRAVENOUS
Status: DISCONTINUED | OUTPATIENT
Start: 2018-04-10 | End: 2018-04-11 | Stop reason: HOSPADM

## 2018-04-10 RX ORDER — ACYCLOVIR 200 MG/1
0-1 CAPSULE ORAL
Status: DISCONTINUED | OUTPATIENT
Start: 2018-04-10 | End: 2018-04-11 | Stop reason: HOSPADM

## 2018-04-10 RX ORDER — REGADENOSON 0.08 MG/ML
0.4 INJECTION, SOLUTION INTRAVENOUS ONCE
Status: COMPLETED | OUTPATIENT
Start: 2018-04-10 | End: 2018-04-10

## 2018-04-10 RX ORDER — ALBUTEROL SULFATE 90 UG/1
2 AEROSOL, METERED RESPIRATORY (INHALATION) EVERY 5 MIN PRN
Status: DISCONTINUED | OUTPATIENT
Start: 2018-04-10 | End: 2018-04-11 | Stop reason: HOSPADM

## 2018-04-10 RX ADMIN — TETROFOSMIN 40 MCI.: 1.38 INJECTION, POWDER, LYOPHILIZED, FOR SOLUTION INTRAVENOUS at 14:35

## 2018-04-10 RX ADMIN — TETROFOSMIN 10.6 MCI.: 1.38 INJECTION, POWDER, LYOPHILIZED, FOR SOLUTION INTRAVENOUS at 13:27

## 2018-04-10 RX ADMIN — REGADENOSON 0.4 MG: 0.08 INJECTION, SOLUTION INTRAVENOUS at 14:34

## 2018-04-17 RX ORDER — DONEPEZIL HYDROCHLORIDE 10 MG/1
TABLET, FILM COATED ORAL
Qty: 210 TABLET | Refills: 0 | OUTPATIENT
Start: 2018-04-17

## 2018-04-17 RX ORDER — MEMANTINE HYDROCHLORIDE 10 MG/1
TABLET ORAL
Qty: 420 TABLET | Refills: 0 | OUTPATIENT
Start: 2018-04-17

## 2018-04-17 NOTE — TELEPHONE ENCOUNTER
Medications were filled for one year 6/20/2017 Patient should have remaining refills, sent refusal and explanation. /. Beckie Mcrae RN

## 2018-04-30 ENCOUNTER — OFFICE VISIT (OUTPATIENT)
Dept: NEUROLOGY | Facility: CLINIC | Age: 68
End: 2018-04-30
Payer: COMMERCIAL

## 2018-04-30 VITALS
BODY MASS INDEX: 24.83 KG/M2 | WEIGHT: 188.2 LBS | RESPIRATION RATE: 16 BRPM | TEMPERATURE: 97.9 F | SYSTOLIC BLOOD PRESSURE: 118 MMHG | DIASTOLIC BLOOD PRESSURE: 65 MMHG | HEART RATE: 61 BPM

## 2018-04-30 DIAGNOSIS — R26.9 GAIT DISTURBANCE: Primary | ICD-10-CM

## 2018-04-30 DIAGNOSIS — F02.80 EARLY ONSET ALZHEIMER'S DEMENTIA WITHOUT BEHAVIORAL DISTURBANCE (H): ICD-10-CM

## 2018-04-30 DIAGNOSIS — G30.0 EARLY ONSET ALZHEIMER'S DEMENTIA WITHOUT BEHAVIORAL DISTURBANCE (H): ICD-10-CM

## 2018-04-30 LAB — VIT B12 SERPL-MCNC: 448 PG/ML (ref 193–986)

## 2018-04-30 RX ORDER — DONEPEZIL HYDROCHLORIDE 10 MG/1
10 TABLET, FILM COATED ORAL DAILY
Qty: 90 TABLET | Refills: 3 | Status: SHIPPED | OUTPATIENT
Start: 2018-04-30

## 2018-04-30 RX ORDER — MEMANTINE HYDROCHLORIDE 10 MG/1
10 TABLET ORAL 2 TIMES DAILY
Qty: 180 TABLET | Refills: 3 | Status: SHIPPED | OUTPATIENT
Start: 2018-04-30 | End: 2018-08-20

## 2018-04-30 ASSESSMENT — PAIN SCALES - GENERAL: PAINLEVEL: NO PAIN (0)

## 2018-04-30 NOTE — MR AVS SNAPSHOT
"              After Visit Summary   4/30/2018    Jose Cruz Jameson    MRN: [id number]           Patient Information     Date Of Birth          1950        Visit Information        Provider Department      4/30/2018 1:45 PM Antonio Ansari MD [facility] NEUROSPECIALTIES        Today's Diagnoses     Gait disturbance    -  1    Early onset Alzheimer's dementia without behavioral disturbance           Follow-ups after your visit        Additional Services     OCCUPATIONAL THERAPY REFERRAL       *This therapy referral will be filtered to a centralized scheduling office at [facility] and the patient will receive a call to schedule an appointment at a [facility] location most convenient for them. *     [facility] provides Occupational Therapy evaluation and treatment and many specialty services across the [facility] system.  If requesting a specialty program, please choose from the list below.    If you have not heard from the scheduling office within 2 business days, please call [phone] for all locations, with the exception of [place], please call [phone] and [facility], please call [phone]    Treatment: Evaluation & Treatment  Special Instructions/Modalities: CPT testing for dementia  Special Programs: Cognition Assessment: please do in fall/winter 2018    Please be aware that coverage of these services is subject to the terms and limitations of your health insurance plan.  Call member services at your health plan with any benefit or coverage questions.      **Note to Provider:  If you are referring outside of [facility] for the therapy appointment, please list the name of the location in the \"special instructions\" above, print the referral and give to the patient to schedule the appointment.            PHYSICAL THERAPY REFERRAL       *This therapy referral will be filtered to a centralized scheduling office at [facility] and the patient will " "receive a call to schedule an appointment at a [facility] location most convenient for them. *     [facility] provides Physical Therapy evaluation and treatment and many specialty services across the [facility] system.  If requesting a specialty program, please choose from the list below.    If you have not heard from the scheduling office within 2 business days, please call [phone] for all locations, with the exception of [place], please call [phone] and [facility], please call [phone]  Treatment: Evaluation & Treatment  Special Instructions/Modalities: balance disorder  Special Programs: Balance/Vestibular    Please be aware that coverage of these services is subject to the terms and limitations of your health insurance plan.  Call member services at your health plan with any benefit or coverage questions.      **Note to Provider:  If you are referring outside of [facility] for the therapy appointment, please list the name of the location in the \"special instructions\" above, print the referral and give to the patient to schedule the appointment.                  Follow-up notes from your care team     Return in about 1 year (around 4/30/2019).      Who to contact     Please call your clinic at [phone] to:    Ask questions about your health    Make or cancel appointments    Discuss your medicines    Learn about your test results    Speak to your doctor            Additional Information About Your Visit        [employer] Information     [employer]las gives you secure access to your electronic health record. If you see a primary care provider, you can also send messages to your care team and make appointments. If you have questions, please call your primary care clinic.  If you do not have a primary care provider, please call [phone] and they will assist you.      [employer]las is an electronic gateway that provides easy, online access to your medical records. With [employer], you can request a " clinic appointment, read your test results, renew a prescription or communicate with your care team.     To access your existing account, please contact your [facility] or call [phone] for assistance.        Care EveryWhere ID     This is your Care EveryWhere ID. This could be used by other organizations to access your [facility] medical records  [id number]        Your Vitals Were     Pulse Temperature Respirations BMI (Body Mass Index)          61 97.9  F (36.6  C) 16 24.83 kg/m2         Blood Pressure from Last 3 Encounters:   04/30/18 118/65   04/02/18 (!) 111/93   12/04/17 102/64    Weight from Last 3 Encounters:   04/30/18 188 lb 3.2 oz (85.4 kg)   04/02/18 186 lb 4 oz (84.5 kg)   12/04/17 185 lb 9.6 oz (84.2 kg)              We Performed the Following     Methylmalonic acid     OCCUPATIONAL THERAPY REFERRAL     PHYSICAL THERAPY REFERRAL     Vitamin B12          Where to get your medicines      These medications were sent to [address]  [address] [place] [address]     Phone:  [phone]     donepezil 10 MG tablet    memantine 10 MG tablet          Primary Care Provider Office Phone # Fax #    Vanessa Awadkelly Lewis [phone] [address] [place] [address]        Equal Access to Services     LOIS CRESPO : Hadii aad ku hadasho Sojudith, waaxda luqadaha, qaybta kaalmada adesungyada, allie tee. So [facility] [phone].    ATENCIÓN: Si habla español, tiene a brown disposición servicios gratuitos de asistencia lingüística. Glendyame al [phone].    We comply with applicable federal civil rights laws and [place] laws. We do not discriminate on the basis of race, color, national origin, age, disability, sex, sexual orientation, or gender identity.            Thank you!     Thank you for choosing [facility] NEUROSPECIALTIES  for your care. Our goal is  always to provide you with excellent care. Hearing back from our patients is one way we can continue to improve our services. Please take a few minutes to complete the written survey that you may receive in the mail after your visit with us. Thank you!             Your Updated Medication List - Protect others around you: Learn how to safely use, store and throw away your medicines at www.disposemymeds.org.          This list is accurate as of 4/30/18 11:59 PM.  Always use your most recent med list.                   Brand Name Dispense Instructions for use Diagnosis    donepezil 10 MG tablet    ARICEPT    90 tablet    Take 1 tablet (10 mg) by mouth daily    Early onset Alzheimer's dementia without behavioral disturbance       memantine 10 MG tablet    NAMENDA    180 tablet    Take 1 tablet (10 mg) by mouth 2 times daily    Early onset Alzheimer's dementia without behavioral disturbance       sertraline 50 MG tablet    ZOLOFT    90 tablet    Take 1 tablet (50 mg) by mouth daily    Major depression in complete remission (H)

## 2018-04-30 NOTE — PROGRESS NOTES
"Service Date: 04/30/2018      INTERVAL HISTORY:  This patient is seen in followup with dementia.  He was last seen by Dr. Wills in 11/2016.  He is here with his wife, Lay.  There has been some deterioration in his neurologic status.  He likes to be active and ride his bike but it has been difficult this winter.  When he does ride his bike, he does wear a helmet.  He goes to a day program.  His siblings help out with the patient's care.  He has 24/7 supervision.  When he goes for a walk with his wife, she comments that he is \"slow\" and that his back will start to bother him.  The patient is quite social with his family.  It is not clear if they play cards or board games.  The wife says they do some of that.  He tries to keep his mind active by reading the newspaper.  He does watch TV, especially sports.  When asked for details about this, he is somewhat vague.  He was on vitamin E, but Lay thinks that it was causing him excessive bruising.  Bleeding is one of the listed side effects of vitamin E.  He is currently not taking any other vitamin.  His diet is good.  He does do chores around the house with his wife's supervision.  He is not driving.  He is not wandering.  He does have repetitive questioning.      PAST MEDICAL HISTORY:   Largely noncontributory.  He does not have high blood pressure or diabetes.  He is on memantine 10 mg twice daily and donepezil 10 mg daily.  He is also on sertraline.  He does not drink.  He has not had pertinent surgery or trauma to the head.  She comments that his balance seems to be worse.      PHYSICAL EXAMINATION:   GENERAL:   The patient is cooperative and in no distress.     VITAL SIGNS:   His blood pressure is 118/65.     NEUROLOGIC:   He easily gets out of his chair and ambulates down the díaz.  He is a little clumsy on his heels and toes and performing a tandem gait.  Visual fields are full to confrontation.  Eye movements are complete and conjugate.  Facies are " symmetric.  There is no pronator drift.  Finger tapping and finger-nose-finger are normal.  Heel-knee-shin is without ataxia.  He scored a 9.5/24 on a modified Mini-Mental Status.  The patient seemed to get restless as the exam went on and started asking when they could leave.      ASSESSMENT:   1.  Dementia of the Alzheimer type with progression.      DISCUSSION:  This patient is seen in followup with dementia of the Alzheimer type.  His exam does show significant impairment on Mini-Mental Status testing.  The wife had several questions, which I answered.  His illness seems to be progressive and he has lost ground since the last time he was here.  I refilled his memantine and donepezil.  I encouraged him to continue on with his current activity level.  I am going to check a vitamin B12 level because of his concern about imbalance.  In , a B12 level was 700.  I will refer him to physical therapy to see if they have any suggestions for his balance.  Otherwise, I will see him in followup in a year.      This was a 40-minute appointment, more than half of which was spent in counseling and coordination of care.      MD NADIA Carter MD             D: 2018   T: 2018   MT: BROOKS      Name:     SIRIA PARR   MRN:      [id number]        Account:      [id number]   :      1950           Service Date: 2018      Document: [id number]           addendum: reviewed normal B12/MMA with wife.

## 2018-04-30 NOTE — LETTER
"4/30/2018       RE: Jose Cruz Jameson  [address]  [facility] [address]     Dear Colleague,    Thank you for referring your patient, Jose Cruz Jameson, to the [facility] NEUROSPECIALTIES at [facility]. Please see a copy of my visit note below.    Service Date: 04/30/2018      INTERVAL HISTORY:  This patient is seen in followup with dementia.  He was last seen by Dr. Wills in 11/2016.  He is here with his wife, Lay.  There has been some deterioration in his neurologic status.  He likes to be active and ride his bike but it has been difficult this winter.  When he does ride his bike, he does wear a helmet.  He goes to a day program.  His siblings help out with the patient's care.  He has 24/7 supervision.  When he goes for a walk with his wife, she comments that he is \"slow\" and that his back will start to bother him.  The patient is quite social with his family.  It is not clear if they play cards or board games.  The wife says they do some of that.  He tries to keep his mind active by reading the newspaper.  He does watch TV, especially sports.  When asked for details about this, he is somewhat vague.  He was on vitamin E, but Lay thinks that it was causing him excessive bruising.  Bleeding is one of the listed side effects of vitamin E.  He is currently not taking any other vitamin.  His diet is good.  He does do chores around the house with his wife's supervision.  He is not driving.  He is not wandering.  He does have repetitive questioning.      PAST MEDICAL HISTORY:   Largely noncontributory.  He does not have high blood pressure or diabetes.  He is on memantine 10 mg twice daily and donepezil 10 mg daily.  He is also on sertraline.  He does not drink.  He has not had pertinent surgery or trauma to the head.  She comments that his balance seems to be worse.      PHYSICAL EXAMINATION:   GENERAL:   The patient is cooperative and in no distress.     VITAL SIGNS:   His blood " pressure is 118/65.     NEUROLOGIC:   He easily gets out of his chair and ambulates down the díaz.  He is a little clumsy on his heels and toes and performing a tandem gait.  Visual fields are full to confrontation.  Eye movements are complete and conjugate.  Facies are symmetric.  There is no pronator drift.  Finger tapping and finger-nose-finger are normal.  Heel-knee-shin is without ataxia.  He scored a 9.5/24 on a modified Mini-Mental Status.  The patient seemed to get restless as the exam went on and started asking when they could leave.      ASSESSMENT:   1.  Dementia of the Alzheimer type with progression.      DISCUSSION:  This patient is seen in followup with dementia of the Alzheimer type.  His exam does show significant impairment on Mini-Mental Status testing.  The wife had several questions, which I answered.  His illness seems to be progressive and he has lost ground since the last time he was here.  I refilled his memantine and donepezil.  I encouraged him to continue on with his current activity level.  I am going to check a vitamin B12 level because of his concern about imbalance.  In 2011, a B12 level was 700.  I will refer him to physical therapy to see if they have any suggestions for his balance.  Otherwise, I will see him in followup in a year.      This was a 40-minute appointment, more than half of which was spent in counseling and coordination of care.      Antonio Ansari MD

## 2018-05-03 LAB — METHYLMALONATE SERPL-SCNC: 0.13 UMOL/L (ref 0–0.4)

## 2018-05-22 ENCOUNTER — HOSPITAL ENCOUNTER (OUTPATIENT)
Dept: PHYSICAL THERAPY | Facility: CLINIC | Age: 68
Setting detail: THERAPIES SERIES
End: 2018-05-22
Attending: PSYCHIATRY & NEUROLOGY
Payer: COMMERCIAL

## 2018-05-22 PROCEDURE — 97112 NEUROMUSCULAR REEDUCATION: CPT | Mod: GP | Performed by: PHYSICAL THERAPIST

## 2018-05-22 PROCEDURE — 40000185 ZZHC STATISTIC PT OUTPT VISIT: Performed by: PHYSICAL THERAPIST

## 2018-05-22 PROCEDURE — 97161 PT EVAL LOW COMPLEX 20 MIN: CPT | Mod: GP | Performed by: PHYSICAL THERAPIST

## 2018-05-23 NOTE — PROGRESS NOTES
PHYSICAL THERAPY INITIAL EVALUATION  05/22/18 1000   Quick Adds   Type of Visit Initial OP PT Evaluation   General Information   Start of Care Date 05/22/18   Referring Physician Dr. Ansari   Orders Evaluate and Treat as Indicated   Order Date 04/30/18   Medical Diagnosis gait disturbance    Onset of illness/injury or Date of Surgery 04/30/18   Precautions/Limitations no known precautions/limitations   Surgical/Medical history reviewed Yes   Pertinent history of current problem (include personal factors and/or comorbidities that impact the POC) Patient's wife reports she and her  like to go for bike rides and they did a lot last summer. Wasn't very active over the winter. Notices Jose Cruz has a little balance issue when trying to put his pants on in the morning and wanted to get it tested to make sure he is still safe to ride his bike.    Prior level of function comment independent   Patient role/Employment history Retired   Current Assistive Devices (none)   Patient/Family Goals Statement have Jose Cruz's balance tested and learn if there is anything they can do to help it    General Information Comments PMH: Alzheimer's   Fall Risk Screen   Fall screen completed by PT   Have you fallen 2 or more times in the past year? No   Have you fallen and had an injury in the past year? No   Timed Up and Go score (seconds) 14.3   Is patient a fall risk? No   Fall screen comments see Stearns and DGI. Higher score on TUG more related to attention deficits than balance deficits.   Pain   Patient currently in pain No   Cognitive Status Examination   Cognitive Comment being followed by Neurology   Strength   Strength Comments LE Strength (tested seated): Hip flexion 5/5 B, Hip abduction 5/5 B, Hip adduction 5-/5 B,  Quads 5/5 B, Hamstrings 5/5 B, Dorsiflexion 5/5 B   Transfer Skills   Transfer Comments able to stand without use of hands    Gait   Gait Comments no AD, mild decreased stride length and heel strike   Gait Special Tests    Gait Special Tests DYNAMIC GAIT INDEX   Gait Special Tests Dynamic Gait Index   Score out of 24 21   Balance Special Tests   Balance Special Tests Stearns balance   Balance Special Tests Stearns Balance   Score out of 56 53   Planned Therapy Interventions   Planned Therapy Interventions balance training   Clinical Impression   Criteria for Skilled Therapeutic Interventions Met yes, treatment indicated   PT Diagnosis balance impairments   Influenced by the following impairments balance impairments   Functional limitations due to impairments mobility   Clinical Presentation Stable/Uncomplicated   Clinical Presentation Rationale currently not fall risk   Clinical Decision Making (Complexity) Low complexity   Therapy Frequency 1 time/week   Predicted Duration of Therapy Intervention (days/wks) 1 visit   Risk & Benefits of therapy have been explained Yes   Patient, Family & other staff in agreement with plan of care Yes   Clinical Impression Comments Patient presenting for balance assessment to make sure he is safe to still ride his bike. Patient demonstrated above threshold scores that do not place him in the fall risk category for both the Dynamic Gait Assessment and the Stearns Balance Assessment. Home exercises given for balance and strength for general conditioning and to help maintain fitness level.   Education Assessment   Preferred Learning Style Listening;Demonstration;Pictures/video   Barriers to Learning No barriers   GOALS   PT Eval Goals 1   Goal 1   Goal Identifier 1   Goal Description Patient and wife will demonstrate understanding of findings on exam.   Target Date 05/22/18   Total Evaluation Time   Total Evaluation Time (Minutes) 30       Please contact me with any questions or concerns.  Thank you for your referral.    Tara Saenz, PT, DPT, OCS  Physical Therapist, Orthopedic Certified Specialist  [facility]  [phone]

## 2018-06-18 PROBLEM — M54.50 ACUTE BILATERAL LOW BACK PAIN WITHOUT SCIATICA: Status: RESOLVED | Noted: 2017-01-02 | Resolved: 2018-06-18

## 2018-06-19 ENCOUNTER — TELEPHONE (OUTPATIENT)
Dept: FAMILY MEDICINE | Facility: CLINIC | Age: 68
End: 2018-06-19

## 2018-06-19 DIAGNOSIS — F32.5 MAJOR DEPRESSION IN COMPLETE REMISSION (H): ICD-10-CM

## 2018-06-19 NOTE — TELEPHONE ENCOUNTER
Spoke with Lay.      Has concerns for possible worsening depression in Jose Cruz.  Has been at his day program 2 days this week as she had an eye procedure done.  The last 2 days he has been talking about wanting to shoot himself or wanting to kill himself and becoming angry when other participants have tried to comfort or console him.    She notes that occasionally he has made comments like this in the past but it has been much more persistent at the day program the last 2 days.    Reviewed option to consider increase in sertraline to address mood.  She is willing to consider.  Concerned abut possible side effects with increase from 50-100mg, would prefer 50-75mg.  New script sent.  She will let me know if there are any side effects or problems with the higher dose.    She will also watch for any worsening symptoms, he has been acting normally while at home.    F/u in 4 weeks, sooner as needed  Vanessa Lewis

## 2018-06-19 NOTE — TELEPHONE ENCOUNTER
Patient's wife called and would like a call back from Dr. Lewis about her husbands behavior.  Wife needs some guidance.      Zehra Thrasher [facility]

## 2018-07-09 ENCOUNTER — OFFICE VISIT (OUTPATIENT)
Dept: FAMILY MEDICINE | Facility: CLINIC | Age: 68
End: 2018-07-09
Payer: COMMERCIAL

## 2018-07-09 VITALS
SYSTOLIC BLOOD PRESSURE: 90 MMHG | HEART RATE: 84 BPM | WEIGHT: 180 LBS | DIASTOLIC BLOOD PRESSURE: 60 MMHG | BODY MASS INDEX: 23.86 KG/M2 | HEIGHT: 73 IN | TEMPERATURE: 98.2 F

## 2018-07-09 DIAGNOSIS — F33.1 MODERATE EPISODE OF RECURRENT MAJOR DEPRESSIVE DISORDER (H): Primary | ICD-10-CM

## 2018-07-09 DIAGNOSIS — H61.23 BILATERAL IMPACTED CERUMEN: ICD-10-CM

## 2018-07-09 PROCEDURE — 99213 OFFICE O/P EST LOW 20 MIN: CPT | Performed by: FAMILY MEDICINE

## 2018-07-09 NOTE — MR AVS SNAPSHOT
"              After Visit Summary   7/9/2018    Jose Cruz Jameson    MRN: [id number]           Patient Information     Date Of Birth          1950        Visit Information        Provider Department      7/9/2018 2:00 PM Vanessa Lewis DO [facility]        Today's Diagnoses     Moderate episode of recurrent major depressive disorder (H)    -  1    Bilateral impacted cerumen           Follow-ups after your visit        Who to contact     Normal or non-critical lab and imaging results will be communicated to you by Unified Inboxhart, letter or phone within 4 business days after the clinic has received the results. If you do not hear from us within 7 days, please contact the clinic through Eversync Solutionst or phone. If you have a critical or abnormal lab result, we will notify you by phone as soon as possible.  Submit refill requests through Parkt or call your pharmacy and they will forward the refill request to us. Please allow 3 business days for your refill to be completed.          If you need to speak with a  for additional information , please call: [phone]           Additional Information About Your Visit        Unified InboxharOplerno Information     Parkt gives you secure access to your electronic health record. If you see a primary care provider, you can also send messages to your care team and make appointments. If you have questions, please call your primary care clinic.  If you do not have a primary care provider, please call [phone] and they will assist you.        Care EveryWhere ID     This is your Care EveryWhere ID. This could be used by other organizations to access your [place] medical records  [id number]        Your Vitals Were     Pulse Temperature Height BMI (Body Mass Index)          84 98.2  F (36.8  C) (Tympanic) 6' 1\" (1.854 m) 23.75 kg/m2         Blood Pressure from Last 3 Encounters:   07/09/18 90/60   04/30/18 118/65   04/02/18 (!) 111/93    Weight from Last 3 " Encounters:   07/09/18 180 lb (81.6 kg)   04/30/18 188 lb 3.2 oz (85.4 kg)   04/02/18 186 lb 4 oz (84.5 kg)              We Performed the Following     DEPRESSION ACTION PLAN (DAP)        Primary Care Provider Office Phone # Fax #    Vanessa Lewis [phone] [address] [facility]   [place] [address]        Equal Access to Services     ANNIKA CRESPO AH: Hadii aad ku hadasho Soomaali, waaxda luqadaha, qaybta kaalmada adeegyada, waxay idiin hayaan adeeg kharash laratna ah. So [facility] [phone].    ATENCIÓN: Si habla español, tiene a brown disposición servicios gratuitos de asistencia lingüística. Llame al [phone].    We comply with applicable federal civil rights laws and [place] laws. We do not discriminate on the basis of race, color, national origin, age, disability, sex, sexual orientation, or gender identity.            Thank you!     Thank you for choosing [facility]  for your care. Our goal is always to provide you with excellent care. Hearing back from our patients is one way we can continue to improve our services. Please take a few minutes to complete the written survey that you may receive in the mail after your visit with us. Thank you!             Your Updated Medication List - Protect others around you: Learn how to safely use, store and throw away your medicines at www.disposemymeds.org.          This list is accurate as of 7/9/18 11:59 PM.  Always use your most recent med list.                   Brand Name Dispense Instructions for use Diagnosis    donepezil 10 MG tablet    ARICEPT    90 tablet    Take 1 tablet (10 mg) by mouth daily    Early onset Alzheimer's dementia without behavioral disturbance       memantine 10 MG tablet    NAMENDA    180 tablet    Take 1 tablet (10 mg) by mouth 2 times daily    Early onset Alzheimer's dementia without behavioral disturbance       sertraline 50 MG tablet    ZOLOFT    135 tablet    Take 1 and 1/2 tablet orally daily    Major  depression in complete remission (H)

## 2018-07-09 NOTE — PROGRESS NOTES
"  SUBJECTIVE:   Jose Cruz Jameson is a 67 year old male who presents to clinic today for the following health issues:      Depression Followup    Status since last visit: Worsened for the last month or so    See PHQ-9 for current symptoms.  Other associated symptoms:     Complicating factors:   Significant life event:  Yes-  Health and recent travel    Current substance abuse:  None  Anxiety or Panic symptoms:  No    PHQ-9 12/30/2016 10/31/2017 7/9/2018   Total Score 0 1 16   Q9: Suicide Ideation Not at all Not at all Several days       PHQ-9  English  PHQ-9   Any Language  Suicide Assessment Five-step Evaluation and Treatment (SAFE-T)    Amount of exercise or physical activity: None    Problems taking medications regularly: No    Medication side effects: tinnitus, sensitive to sound    Diet: regular (no restrictions)    Following up from dosage change with sertraline.  Spoke with Lay on 6/19 after an episode at Jose Cruz's day program where he was shouting out that he wanted to kill himself.  He could not be consoled and was disruptive to the other participants.  She noted that he would occasionally say things like that in the past but was never disruptive and could be easily redirected.    We increased sertraline to 75mg.  She feels he has been tolerating this dose well.  Has not had any further outbursts.  Will mumble under his breath at times about many things, she often finds it unintelligible.  Occasionally she will hear him say \"I want to die\" under his breath but it has been perhaps less frequent since the dose increase.  He has been a bit more agitated and less patient.  Remains redirectable but often will say he \"wants to go home\" even when he is home.    There have been changes with some travel around the death of Lay's mother and managing her estate which seems to perhaps have been a trigger for him.      Lay has never been concerned that he would attempt to harm himself.  He has never been violent or " aggressive.  Has never attempted self harm.  He currently has 24 hour supervision given the advanced stage of his dementia.    When questioned Jose Cruz denies depression.  He states he enjoys being around his family.  He is anxious to conclude the appointment and go home.    *  Notes he has also seems sensitive to noise and c/o his ears.  Wondering if there is a wax issue    Problem list and histories reviewed & adjusted, as indicated.  Additional history: as documented    Reviewed and updated as needed this visit by clinical staff  Tobacco  Allergies  Meds  Problems  Med Hx  Surg Hx  Fam Hx  Soc Hx        Reviewed and updated as needed this visit by Provider  Tobacco  Allergies  Meds  Problems  Med Hx  Surg Hx  Fam Hx  Soc Hx          ROS: Remainder of Constitutional, CV, Respiratory, GI,  negative with exception of that mentioned above    PE:  VS as above   Gen:  WN/WD/WH male in NAD   Ears:  elvis TM occluded by cerumen   Psych: Alert and oriented times 1; coherent speech, normal   rate and volume, no hallucinations or delusions appreciated  or delusions  His affect is flat.  He responds appropriately to questions.    Attempted cerumen irrigation but pt did not tolerate well.  Reviewed debrox home use    A/P:      ICD-10-CM    1. Moderate episode of recurrent major depressive disorder (H) F33.1    2. Bilateral impacted cerumen H61.23        Reviewed with Lay, she completed the PHQ9 based on her impressions as Jose Cruz is unable to do so.  She does not feel he is a risk to himself or others.  She is unsure of his current symptoms are a function of mood or disease progression.      Reviewed that we are early yet to be seeing the full effect of the increase in the sertraline.  Perhaps some improvement noted but hard to say.  Reviewed that I would reach out to Dr Ansari as well regarding his thoughts and any potential medication changes.    Will discuss with Lay after I speak with Dr Ansari.    For  now will continue current dose of sertraline to at least 4-6 weeks for full trial.  If no additional improvement will consider dose increase.  She will contact or seek additional care for any worsening symptoms or concerns.

## 2018-07-09 NOTE — LETTER
My Depression Action Plan  Name: Jose Cruz Jameson   Date of Birth 1950  Date: 7/9/2018    My doctor: Vanessa Lewis   My clinic: [address] [address] [phone]          GREEN    ZONE   Good Control    What it looks like:     Things are going generally well. You have normal up s and down s. You may even feel depressed from time to time, but bad moods usually last less than a day.   What you need to do:  1. Continue to care for yourself (see self care plan)  2. Check your depression survival kit and update it as needed  3. Follow your physician s recommendations including any medication.  4. Do not stop taking medication unless you consult with your physician first.           YELLOW         ZONE Getting Worse    What it looks like:     Depression is starting to interfere with your life.     It may be hard to get out of bed; you may be starting to isolate yourself from others.    Symptoms of depression are starting to last most all day and this has happened for several days.     You may have suicidal thoughts but they are not constant.   What you need to do:     1. Call your care team, your response to treatment will improve if you keep your care team informed of your progress. Yellow periods are signs an adjustment may need to be made.     2. Continue your self-care, even if you have to fake it!    3. Talk to someone in your support network    4. Open up your depression survival kit           RED    ZONE Medical Alert - Get Help    What it looks like:     Depression is seriously interfering with your life.     You may experience these or other symptoms: You can t get out of bed most days, can t work or engage in other necessary activities, you have trouble taking care of basic hygiene, or basic responsibilities, thoughts of suicide or death that will not go away, self-injurious behavior.     What you need to do:  1. Call your care team and  request a same-day appointment. If they are not available (weekends or after hours) call your local crisis line, emergency room or 911.            Depression Self Care Plan / Survival Kit    Self-Care for Depression  Here s the deal. Your body and mind are really not as separate as most people think.  What you do and think affects how you feel and how you feel influences what you do and think. This means if you do things that people who feel good do, it will help you feel better.  Sometimes this is all it takes.  There is also a place for medication and therapy depending on how severe your depression is, so be sure to consult with your medical provider and/ or Behavioral Health Consultant if your symptoms are worsening or not improving.     In order to better manage my stress, I will:    Exercise  Get some form of exercise, every day. This will help reduce pain and release endorphins, the  feel good  chemicals in your brain. This is almost as good as taking antidepressants!  This is not the same as joining a gym and then never going! (they count on that by the way ) It can be as simple as just going for a walk or doing some gardening, anything that will get you moving.      Hygiene   Maintain good hygiene (Get out of bed in the morning, Make your bed, Brush your teeth, Take a shower, and Get dressed like you were going to work, even if you are unemployed).  If your clothes don't fit try to get ones that do.    Diet  I will strive to eat foods that are good for me, drink plenty of water, and avoid excessive sugar, caffeine, alcohol, and other mood-altering substances.  Some foods that are helpful in depression are: complex carbohydrates, B vitamins, flaxseed, fish or fish oil, fresh fruits and vegetables.    Psychotherapy  I agree to participate in Individual Therapy (if recommended).    Medication  If prescribed medications, I agree to take them.  Missing doses can result in serious side effects.  I understand that  drinking alcohol, or other illicit drug use, may cause potential side effects.  I will not stop my medication abruptly without first discussing it with my provider.    Staying Connected With Others  I will stay in touch with my friends, family members, and my primary care provider/team.    Use your imagination  Be creative.  We all have a creative side; it doesn t matter if it s oil painting, sand castles, or mud pies! This will also kick up the endorphins.    Witness Beauty  (AKA stop and smell the roses) Take a look outside, even in mid-winter. Notice colors, textures. Watch the squirrels and birds.     Service to others  Be of service to others.  There is always someone else in need.  By helping others we can  get out of ourselves  and remember the really important things.  This also provides opportunities for practicing all the other parts of the program.    Humor  Laugh and be silly!  Adjust your TV habits for less news and crime-drama and more comedy.    Control your stress  Try breathing deep, massage therapy, biofeedback, and meditation. Find time to relax each day.     My support system    Clinic Contact:  Phone number:    Contact 1:  Phone number:    Contact 2:  Phone number:    Taoism/:  Phone number:    Therapist:  Phone number:    Local crisis center:    Phone number:    Other community support:  Phone number:

## 2018-07-09 NOTE — NURSING NOTE
"Initial BP 90/60  Pulse 84  Temp 98.2  F (36.8  C) (Tympanic)  Ht 6' 1\" (1.854 m)  Wt 180 lb (81.6 kg)  BMI 23.75 kg/m2 Estimated body mass index is 23.75 kg/(m^2) as calculated from the following:    Height as of this encounter: 6' 1\" (1.854 m).    Weight as of this encounter: 180 lb (81.6 kg). .      "

## 2018-07-10 ASSESSMENT — PATIENT HEALTH QUESTIONNAIRE - PHQ9: SUM OF ALL RESPONSES TO PHQ QUESTIONS 1-9: 16

## 2018-07-13 ENCOUNTER — MYC MEDICAL ADVICE (OUTPATIENT)
Dept: FAMILY MEDICINE | Facility: CLINIC | Age: 68
End: 2018-07-13

## 2018-07-13 ENCOUNTER — TELEPHONE (OUTPATIENT)
Dept: FAMILY MEDICINE | Facility: CLINIC | Age: 68
End: 2018-07-13

## 2018-07-13 NOTE — TELEPHONE ENCOUNTER
Health Call Center    Phone Message    May a detailed message be left on voicemail: yes    Reason for Call: Other: Dr. Lewis calling to discuss pt with Dr. Ansari. Please call [phone]     Action Taken: Message routed to:  [facility] ([facility]

## 2018-07-13 NOTE — TELEPHONE ENCOUNTER
Called the number [phone] to speak to a nurse to see if this is urgent for Dr. Ansari to speak with Dr. Lewis now. They told me this was not urgent, so I sent Dr. Ansari a message letting him know that Dr. Lewis would like him to callher about this patient at the number given.

## 2018-07-13 NOTE — TELEPHONE ENCOUNTER
Please call to [facility].  I would like to speak with Dr Antnoio Ansari about this mutual patient.  No emergency, when he is available is fine.  Thanks.  Vanessa Lewis

## 2018-07-13 NOTE — TELEPHONE ENCOUNTER
Called Dr. Ansari office and he is not in clinic today, but they put a message in to have him call you next week on our doctor line.    Trevor Multani [facility]

## 2018-07-17 PROBLEM — F33.1 MODERATE EPISODE OF RECURRENT MAJOR DEPRESSIVE DISORDER (H): Status: ACTIVE | Noted: 2018-07-17

## 2018-07-17 NOTE — TELEPHONE ENCOUNTER
Spoke with lay.  After conservation with Dr Ansari we'll try weaning off the memantine by taking 1 tablet daily for 3-4 days then stopping.  We'll monitor for a few days and see if significant improvement is seen.    Lay will update me by phone or MyChart    Vanessa Lewis

## 2018-07-31 ENCOUNTER — ALLIED HEALTH/NURSE VISIT (OUTPATIENT)
Dept: FAMILY MEDICINE | Facility: CLINIC | Age: 68
End: 2018-07-31
Payer: COMMERCIAL

## 2018-07-31 DIAGNOSIS — H61.23 BILATERAL IMPACTED CERUMEN: Primary | ICD-10-CM

## 2018-07-31 PROCEDURE — 99207 ZZC NO CHARGE NURSE ONLY: CPT

## 2018-07-31 NOTE — MR AVS SNAPSHOT
After Visit Summary   7/31/2018    Jose Cruz Jameson    MRN: [id number]           Patient Information     Date Of Birth          1950        Visit Information        Provider Department      7/31/2018 9:00 AM [facility]        Today's Diagnoses     Bilateral impacted cerumen    -  1       Follow-ups after your visit        Who to contact     Normal or non-critical lab and imaging results will be communicated to you by RealOpshart, letter or phone within 4 business days after the clinic has received the results. If you do not hear from us within 7 days, please contact the clinic through RealOpshart or phone. If you have a critical or abnormal lab result, we will notify you by phone as soon as possible.  Submit refill requests through Conscious Box or call your pharmacy and they will forward the refill request to us. Please allow 3 business days for your refill to be completed.          If you need to speak with a  for additional information , please call: [phone]           Additional Information About Your Visit        RealOpsharOver 40 Females Information     Conscious Box gives you secure access to your electronic health record. If you see a primary care provider, you can also send messages to your care team and make appointments. If you have questions, please call your primary care clinic.  If you do not have a primary care provider, please call [phone] and they will assist you.        Care EveryWhere ID     This is your Care EveryWhere ID. This could be used by other organizations to access your [place] medical records  [id number]         Blood Pressure from Last 3 Encounters:   07/09/18 90/60   04/30/18 118/65   04/02/18 (!) 111/93    Weight from Last 3 Encounters:   07/09/18 180 lb (81.6 kg)   04/30/18 188 lb 3.2 oz (85.4 kg)   04/02/18 186 lb 4 oz (84.5 kg)              Today, you had the following     No orders found for display       Primary Care Provider Office Phone #  Fax #    Vanessa Lewis  [phone] [address] [place] [address]        Equal Access to Services     ANNIKA CRESPO : Hadii aad ku hadoskaro Sojudith, wajoseda luqadaha, qaybta kaalmada adetito, allie lew laRichjuan tee. So [facility] [phone].    ATENCIÓN: Si habla español, tiene a brown disposición servicios gratuitos de asistencia lingüística. Llame al [phone].    We comply with applicable federal civil rights laws and [place] laws. We do not discriminate on the basis of race, color, national origin, age, disability, sex, sexual orientation, or gender identity.            Thank you!     Thank you for choosing [facility] [place]  for your care. Our goal is always to provide you with excellent care. Hearing back from our patients is one way we can continue to improve our services. Please take a few minutes to complete the written survey that you may receive in the mail after your visit with us. Thank you!             Your Updated Medication List - Protect others around you: Learn how to safely use, store and throw away your medicines at www.disposemymeds.org.          This list is accurate as of 7/31/18  9:24 AM.  Always use your most recent med list.                   Brand Name Dispense Instructions for use Diagnosis    donepezil 10 MG tablet    ARICEPT    90 tablet    Take 1 tablet (10 mg) by mouth daily    Early onset Alzheimer's dementia without behavioral disturbance       memantine 10 MG tablet    NAMENDA    180 tablet    Take 1 tablet (10 mg) by mouth 2 times daily    Early onset Alzheimer's dementia without behavioral disturbance       sertraline 50 MG tablet    ZOLOFT    135 tablet    Take 1 and 1/2 tablet orally daily    Major depression in complete remission (H)

## 2018-07-31 NOTE — PROGRESS NOTES
RN approval given to irrigate ears. Successfully removed all cerumen from both ears.  Tawny Robin CMA

## 2018-08-20 ENCOUNTER — MYC MEDICAL ADVICE (OUTPATIENT)
Dept: FAMILY MEDICINE | Facility: CLINIC | Age: 68
End: 2018-08-20

## 2018-08-20 DIAGNOSIS — F02.80 EARLY ONSET ALZHEIMER'S DEMENTIA WITHOUT BEHAVIORAL DISTURBANCE (H): ICD-10-CM

## 2018-08-20 DIAGNOSIS — G30.0 EARLY ONSET ALZHEIMER'S DEMENTIA WITHOUT BEHAVIORAL DISTURBANCE (H): ICD-10-CM

## 2018-08-20 DIAGNOSIS — F33.1 MODERATE EPISODE OF RECURRENT MAJOR DEPRESSIVE DISORDER (H): Primary | ICD-10-CM

## 2018-08-20 NOTE — TELEPHONE ENCOUNTER
"Spoke with Lay.  She has not seen any significant improvement in mood after weaning off the memantine.      There has not been any worsening of his symptoms, perhaps he is a bit quieter about these comments.  They do not happen daily but do happen when he is \"not happy with something\".   There have been no attempts at self harm.    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  They were essentially in a 1:1 situation with him due to this.   Lay states he does that at home as well, just wants whoever he is following to interact with him and pay attention to him.    Agree with geriatric psych, referral place.  Lay will let me know when she is able to get an appointment.  Discussed assistance from social work if needed to expedite things.      Offered consideration of increasing sertraline, she is not sure, does not know if it has made any difference, perhaps would like to try a different medicine.  At this point will wait to see when psych appointment can be made.    Vanessa Lewis        "

## 2018-08-20 NOTE — TELEPHONE ENCOUNTER
"Dr. Lewis,  Spoke with the wife Lay who says the patient is mumbling \"I don't want to live\" at bedtime under his breath when he feels no one is listening or paying attention to him. Wife says the patient has not verbalized a plan just says these things, when asked how long wife says has been doing this \" for a long time but has decreased somewhat.\" Asked the wife if feels she knows what to do if the patient is saying and formulating plan to hurt himself and the wife says \"I know what to do\" and got upset that the nurse was asking questions to confirm that there is a plan if patient acts on statements. Did not discuss going to ER or [place] with the wife as she was starting to get frustrated on the phone and cried, but says she \"knows what to do.\" Advised to the wife ok to drop off forms for PCP to review, but Lay is wanting your opinion on geriatric psych doctors. Lay says she does not feel the medications are helping and feels the patient is not getting better. Do you advise care coordinator?  Please advise.    ERICH Colvin    "

## 2018-10-11 ENCOUNTER — TELEPHONE (OUTPATIENT)
Dept: FAMILY MEDICINE | Facility: CLINIC | Age: 68
End: 2018-10-11

## 2018-10-11 NOTE — LETTER
10/15/18    To whom it may concern,         Jose Cruz Jameson ( 1950) is currently a patient under my medical care.  He suffers from advanced Alzheimer's disease and is no longer able to make financial decisions for himself.  Please allow his power of  to make those decisions in his stead.    Sincerely,         Dr. Vanessa Lewis, DO

## 2018-10-16 LAB — PHQ9 SCORE: 8

## 2018-12-04 ENCOUNTER — ALLIED HEALTH/NURSE VISIT (OUTPATIENT)
Dept: FAMILY MEDICINE | Facility: CLINIC | Age: 68
End: 2018-12-04
Payer: COMMERCIAL

## 2018-12-04 DIAGNOSIS — Z23 NEED FOR PROPHYLACTIC VACCINATION AND INOCULATION AGAINST INFLUENZA: Primary | ICD-10-CM

## 2018-12-04 PROCEDURE — 90662 IIV NO PRSV INCREASED AG IM: CPT

## 2018-12-04 PROCEDURE — 99207 ZZC NO CHARGE NURSE ONLY: CPT

## 2018-12-04 PROCEDURE — 90471 IMMUNIZATION ADMIN: CPT

## 2018-12-04 NOTE — MR AVS SNAPSHOT
After Visit Summary   12/4/2018    Jose Cruz Jameson    MRN: [id number]           Patient Information     Date Of Birth          1950        Visit Information        Provider Department      12/4/2018 9:45 AM [facility], [facility]        Today's Diagnoses     Need for prophylactic vaccination and inoculation against influenza    -  1       Follow-ups after your visit        Your next 10 appointments already scheduled     Apr 01, 2019 12:45 PM CDT   Return Visit with Antonio Ansari MD   [facility] ([facility])    [address]  [address]  [place] [address] [phone]              Who to contact     Normal or non-critical lab and imaging results will be communicated to you by [employer], letter or phone within 4 business days after the clinic has received the results. If you do not hear from us within 7 days, please contact the clinic through MyChart or phone. If you have a critical or abnormal lab result, we will notify you by phone as soon as possible.  Submit refill requests through [employer] or call your pharmacy and they will forward the refill request to us. Please allow 3 business days for your refill to be completed.          If you need to speak with a  for additional information , please call: [phone]           Additional Information About Your Visit        [employer] Information     [employer] gives you secure access to your electronic health record. If you see a primary care provider, you can also send messages to your care team and make appointments. If you have questions, please call your primary care clinic.  If you do not have a primary care provider, please call [phone] and they will assist you.        Care EveryWhere ID     This is your Care EveryWhere ID. This could be used by other organizations to access your [place] medical records  [id number]         Blood Pressure from Last 3 Encounters:    07/09/18 90/60   04/30/18 118/65   04/02/18 (!) 111/93    Weight from Last 3 Encounters:   07/09/18 180 lb (81.6 kg)   04/30/18 188 lb 3.2 oz (85.4 kg)   04/02/18 186 lb 4 oz (84.5 kg)              We Performed the Following     FLU VACCINE, INCREASED ANTIGEN, PRESV FREE, AGE 65+ [96373]     Vaccine Administration, Initial [[id number]]        Primary Care Provider Office Phone # Fax #    Vanessa Peters DO [place] [phone] [address] [place] [address]        Equal Access to Services     [facility] : Hadii onel Alex, waaxda luyared, qaybta kaalmada yemi, allie tee. So [facility] [phone].    ATENCIÓN: Si habla español, tiene a brown disposición servicios gratuitos de asistencia lingüística. Llame al [phone].    We comply with applicable federal civil rights laws and [place] laws. We do not discriminate on the basis of race, color, national origin, age, disability, sex, sexual orientation, or gender identity.            Thank you!     [facility] you [facility]  for your care. Our goal is always to provide you with excellent care. Hearing back from our patients is one way we can continue to improve our services. Please take a few minutes to complete the written survey that you may receive in the mail after your visit with us. Thank you!             Your Updated Medication List - Protect others around you: Learn how to safely use, store and throw away your medicines at www.disposemymeds.org.          This list is accurate as of 12/4/18 10:02 AM.  Always use your most recent med list.                   Brand Name Dispense Instructions for use Diagnosis    donepezil 10 MG tablet    ARICEPT    90 tablet    Take 1 tablet (10 mg) by mouth daily    Early onset Alzheimer's dementia without behavioral disturbance       sertraline 50 MG tablet    ZOLOFT    135 tablet    Take 1 and 1/2 tablet orally daily    Major depression in  complete remission (H)

## 2018-12-04 NOTE — PROGRESS NOTES

## 2019-01-23 ENCOUNTER — TELEPHONE (OUTPATIENT)
Dept: FAMILY MEDICINE | Facility: CLINIC | Age: 69
End: 2019-01-23

## 2019-01-23 NOTE — TELEPHONE ENCOUNTER
I let Lay know that usually Dr Lewis likes to touch bases every 6 months when treating depression. She could do this through an E visit if she would like or could just make an appointment for him. Beckie Mcrae RN

## 2019-01-23 NOTE — TELEPHONE ENCOUNTER
Pt spouse tigist has left a VM that she wants to talk to a nurse to see when her  needs to be seen again and states she had other questions, please call her back     Geeta Vance, [place]

## 2019-01-24 ENCOUNTER — TELEPHONE (OUTPATIENT)
Dept: FAMILY MEDICINE | Facility: CLINIC | Age: 69
End: 2019-01-24

## 2019-01-24 NOTE — TELEPHONE ENCOUNTER
Panel Management Review      Patient has the following on his problem list:     Depression / Dysthymia review    Measure:  Needs PHQ-9 score of 4 or less during index window.  Administer PHQ-9 and if score is 5 or more, send encounter to provider for next steps.    5 - 7 month window range: 11/09/18-03/09/19    PHQ-9 SCORE 12/30/2016 10/31/2017 7/9/2018   PHQ-9 Total Score - - -   PHQ-9 Total Score 0 1 16       If PHQ-9 recheck is 5 or more, route to provider for next steps.    Patient is due for:  PHQ9      Composite cancer screening  Chart review shows that this patient is due/due soon for the following None  Summary:    Patient is due/failing the following:   PHQ9    Action needed:   Patient needs to do PHQ9.    Type of outreach:    none, see telephone encounter from yesterday.    Questions for provider review:    None                                                                                                                                    Reba Brown CMA       Chart routed to none .

## 2019-02-13 ENCOUNTER — TELEPHONE (OUTPATIENT)
Dept: FAMILY MEDICINE | Facility: CLINIC | Age: 69
End: 2019-02-13

## 2019-02-13 NOTE — TELEPHONE ENCOUNTER
Pt spouse  called and  Left mag states that she is looking to get a parking sticker for adelaida.     Geeta Vance, [place]

## 2019-02-14 ENCOUNTER — MYC MEDICAL ADVICE (OUTPATIENT)
Dept: FAMILY MEDICINE | Facility: CLINIC | Age: 69
End: 2019-02-14

## 2019-02-14 NOTE — TELEPHONE ENCOUNTER
Called and left message for quique.  Let her know from was at  for  and asked her to call back with update on Jose Cruz.    Vanessa Lewis

## 2019-02-15 NOTE — TELEPHONE ENCOUNTER
Could you please call Lay back and let her know that I do think a visit would be a good idea when they are able.   We can complete a POLST and that time and talk about how things are going.    Vanessa Lewis

## 2019-03-05 ENCOUNTER — OFFICE VISIT (OUTPATIENT)
Dept: FAMILY MEDICINE | Facility: CLINIC | Age: 69
End: 2019-03-05
Payer: COMMERCIAL

## 2019-03-05 VITALS
TEMPERATURE: 97.1 F | DIASTOLIC BLOOD PRESSURE: 60 MMHG | BODY MASS INDEX: 26.39 KG/M2 | HEART RATE: 60 BPM | WEIGHT: 200 LBS | SYSTOLIC BLOOD PRESSURE: 102 MMHG

## 2019-03-05 DIAGNOSIS — F02.80 EARLY ONSET ALZHEIMER'S DEMENTIA WITHOUT BEHAVIORAL DISTURBANCE (H): ICD-10-CM

## 2019-03-05 DIAGNOSIS — Z00.00 MEDICARE ANNUAL WELLNESS VISIT, SUBSEQUENT: Primary | ICD-10-CM

## 2019-03-05 DIAGNOSIS — G30.0 EARLY ONSET ALZHEIMER'S DEMENTIA WITHOUT BEHAVIORAL DISTURBANCE (H): ICD-10-CM

## 2019-03-05 PROCEDURE — G0438 PPPS, INITIAL VISIT: HCPCS | Performed by: FAMILY MEDICINE

## 2019-03-05 RX ORDER — MIRTAZAPINE 30 MG/1
30 TABLET, FILM COATED ORAL AT BEDTIME
Refills: 3 | COMMUNITY
Start: 2019-02-19 | End: 2020-09-10 | Stop reason: DRUGHIGH

## 2019-03-05 RX ORDER — GABAPENTIN 100 MG/1
100 CAPSULE ORAL DAILY PRN
Refills: 1 | COMMUNITY
Start: 2019-02-19

## 2019-03-05 RX ORDER — GABAPENTIN 300 MG/1
300 CAPSULE ORAL 3 TIMES DAILY
Refills: 1 | COMMUNITY
Start: 2019-02-19

## 2019-03-05 NOTE — NURSING NOTE
"Initial /60   Pulse 60   Temp 97.1  F (36.2  C) (Tympanic)   Wt 90.7 kg (200 lb)   BMI 26.39 kg/m   Estimated body mass index is 26.39 kg/m  as calculated from the following:    Height as of 7/9/18: 1.854 m (6' 1\").    Weight as of this encounter: 90.7 kg (200 lb). .      "

## 2019-03-05 NOTE — PATIENT INSTRUCTIONS
Preventive Health Recommendations:     See your health care provider every year to    Review health changes.     Discuss preventive care.      Review your medicines if your doctor has prescribed any.    Talk with your health care provider about whether you should have a test to screen for prostate cancer (PSA).    Every 3 years, have a diabetes test (fasting glucose). If you are at risk for diabetes, you should have this test more often.    Every 5 years, have a cholesterol test. Have this test more often if you are at risk for high cholesterol or heart disease.     Every 10 years, have a colonoscopy. Or, have a yearly FIT test (stool test). These exams will check for colon cancer.    Talk to with your health care provider about screening for Abdominal Aortic Aneurysm if you have a family history of AAA or have a history of smoking.  Shots:     Get a flu shot each year.     Get a tetanus shot every 10 years.     Talk to your doctor about your pneumonia vaccines. There are now two you should receive - Pneumovax (PPSV 23) and Prevnar (PCV 13).    Talk to your pharmacist about a shingles vaccine.     Talk to your doctor about the hepatitis B vaccine.  Nutrition:     Eat at least 5 servings of fruits and vegetables each day.     Eat whole-grain bread, whole-wheat pasta and brown rice instead of white grains and rice.     Get adequate Calcium and Vitamin D.   Lifestyle    Exercise for at least 150 minutes a week (30 minutes a day, 5 days a week). This will help you control your weight and prevent disease.     Limit alcohol to one drink per day.     No smoking.     Wear sunscreen to prevent skin cancer.     See your dentist every six months for an exam and cleaning.     See your eye doctor every 1 to 2 years to screen for conditions such as glaucoma, macular degeneration and cataracts.

## 2019-03-05 NOTE — PROGRESS NOTES
"  SUBJECTIVE:   Jose Cruz Jameson is a 68 year old male who presents for Preventive Visit.    Are you in the first 12 months of your Medicare Part B coverage?  No    Physical Health:    In general, how would you rate your overall physical health? fair    Outside of work, how many days during the week do you exercise? none    Outside of work, approximately how many minutes a day do you exercise?not applicable    If you drink alcohol do you typically have >3 drinks per day or >7 drinks per week? No    Do you usually eat at least 4 servings of fruit and vegetables a day, include whole grains & fiber and avoid regularly eating high fat or \"junk\" foods? Yes    Do you have any problems taking medications regularly?  No    Do you have any side effects from medications? none    Needs assistance for the following daily activities: telephone use, transportation, shopping, preparing meals, housework, bathing, laundry, money management and taking medicine    Which of the following safety concerns are present in your home?  none identified     Hearing impairment: No    In the past 6 months, have you been bothered by leaking of urine? no    Mental Health:    In general, how would you rate your overall mental or emotional health? good  PHQ-2 Score:      Do you feel safe in your environment? Yes    Do you have a Health Care Directive? Yes: Advance Directive has been received and scanned.    Additional concerns to address?  YES - POLST, review medication     Pt's wife has been providing 24 hour care and supervision in the home.  They have a door alarm installed and are registered with the police department in case of wandering.    Not interested in placement    Fall risk:  Fallen 2 or more times in the past year?: No  Any fall with injury in the past year?: Yes  Timed Up and Go Test (>13.5 is fall risk; contact physician) : 5    Cognitive Screening: Not appropriate due to known dementia    Do you have sleep apnea, excessive snoring or " "daytime drowsiness?: no          Reviewed and updated as needed this visit by clinical staff  Tobacco  Allergies  Meds  Med Hx  Surg Hx  Fam Hx  Soc Hx        Reviewed and updated as needed this visit by Provider  Tobacco  Meds  Med Hx  Surg Hx  Fam Hx  Soc Hx       Social History     Tobacco Use     Smoking status: Former Smoker     Packs/day: 0.15     Years: 20.00     Pack years: 3.00     Types: Cigarettes     Start date: 11/29/2013     Smokeless tobacco: Never Used     Tobacco comment: counseled 9/11/08   Substance Use Topics     Alcohol use: No                           Current providers sharing in care for this patient include:   Patient Care Team:  Vanessa Lewis DO as PCP - General (Family Practice)  Vanessa Lewis DO as Assigned PCP    The following health maintenance items are reviewed in Epic and correct as of today:  Health Maintenance   Topic Date Due     ADVANCE DIRECTIVE PLANNING Q5 YRS  11/22/2016     ZOSTER IMMUNIZATION (2 of 3) 11/24/2017     MEDICARE ANNUAL WELLNESS VISIT  06/20/2018     PHQ-9 Q6 MONTHS  01/09/2019     FALL RISK ASSESSMENT  04/30/2019     LIPID SCREEN Q5 YR MALE (SYSTEM ASSIGNED)  06/20/2022     DTAP/TDAP/TD IMMUNIZATION (4 - Td) 06/20/2027     COLONOSCOPY Q10 YR  07/10/2027     DEPRESSION ACTION PLAN  Completed     INFLUENZA VACCINE  Completed     AORTIC ANEURYSM SCREENING (SYSTEM ASSIGNED)  Completed     HEPATITIS C SCREENING  Addressed     IPV IMMUNIZATION  Aged Out     MENINGITIS IMMUNIZATION  Aged Out       ROS:  Constitutional, HEENT, cardiovascular, pulmonary, gi and gu systems are negative, except as otherwise noted.    OBJECTIVE:   /60   Pulse 60   Temp 97.1  F (36.2  C) (Tympanic)   Wt 90.7 kg (200 lb)   BMI 26.39 kg/m   Estimated body mass index is 26.39 kg/m  as calculated from the following:    Height as of 7/9/18: 1.854 m (6' 1\").    Weight as of this encounter: 90.7 kg (200 lb).  EXAM:   GENERAL: healthy, alert and no distress  EYES: Eyes " "grossly normal to inspection, PERRL and conjunctivae and sclerae normal  NECK: no adenopathy, no asymmetry, masses, or scars and thyroid normal to palpation  RESP: lungs clear to auscultation - no rales, rhonchi or wheezes  CV: regular rate and rhythm, normal S1 S2, no S3 or S4, no murmur, click or rub, no peripheral edema and peripheral pulses strong  ABDOMEN: soft, nontender, no hepatosplenomegaly, no masses and bowel sounds normal  MS: no gross musculoskeletal defects noted, no edema  SKIN: no suspicious lesions or rashes  PSYCH: confused, disoriented, affect flat and appearance well groomed    Diagnostic Test Results:  none     ASSESSMENT / PLAN:       ICD-10-CM    1. Medicare annual wellness visit, subsequent Z00.00    2. Early onset Alzheimer's dementia without behavioral disturbance G30.0     F02.80        Reviewed home safety and end of life planning.  POLST completed.  No changes at this time.          End of Life Planning:  Patient currently has an advanced directive: POLST completed today with pt's healthcare agent    COUNSELING:  Reviewed preventive health counseling, as reflected in patient instructions  Special attention given to:       Regular exercise       Healthy diet/nutrition       Fall risk prevention    BP Readings from Last 1 Encounters:   03/05/19 102/60     Estimated body mass index is 26.39 kg/m  as calculated from the following:    Height as of 7/9/18: 1.854 m (6' 1\").    Weight as of this encounter: 90.7 kg (200 lb).           reports that he has quit smoking. His smoking use included cigarettes. He started smoking about 5 years ago. He has a 3.00 pack-year smoking history. he has never used smokeless tobacco.      Appropriate preventive services were discussed with this patient, including applicable screening as appropriate for cardiovascular disease, diabetes, osteopenia/osteoporosis, and glaucoma.  As appropriate for age/gender, discussed screening for colorectal cancer, prostate " cancer, breast cancer, and cervical cancer. Checklist reviewing preventive services available has been given to the patient.    Reviewed patients plan of care and provided an AVS. The Complex Care Plan (for patients with higher acuity and needing more deliberate coordination of services) for Jose Cruz meets the Care Plan requirement. This Care Plan has been established and reviewed with the spouse and caregiver.    Counseling Resources:  ATP IV Guidelines  Pooled Cohorts Equation Calculator  Breast Cancer Risk Calculator  FRAX Risk Assessment  ICSI Preventive Guidelines  Dietary Guidelines for Americans, 2010  USDA's MyPlate  ASA Prophylaxis  Lung CA Screening    Vanessa Lewis DO  [facility]

## 2019-03-27 ENCOUNTER — DOCUMENTATION ONLY (OUTPATIENT)
Dept: OTHER | Facility: CLINIC | Age: 69
End: 2019-03-27

## 2019-03-27 ENCOUNTER — AMBULATORY - HEALTHEAST (OUTPATIENT)
Dept: OTHER | Facility: CLINIC | Age: 69
End: 2019-03-27
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
## 2019-10-02 ENCOUNTER — HEALTH MAINTENANCE LETTER (OUTPATIENT)
Age: 69
End: 2019-10-02

## 2020-01-02 NOTE — TELEPHONE ENCOUNTER
Call placed to Lay.  Informed her the letter is available stating Jose Cruz is not able to make his own financial decisions.  Offered to mail her a copy or have it available a the . Lay declined needing the document at this time,will let us know if/when she needs a .  Brandy PUGH/REGLA    
I spoke to Lay. She said that a social security official said she should have a letter from Jose Cruz's doctor that says she should be incharge of his financial affairs due to his diagnosis.    Lay says she doesn't understand why the letter is even necessary but she said Jose Cruz saw you recently and you would understand.    I let her know I would just forward the message and wait for your direction. Beckie Mcrae RN    
I wrote a letter stating that Jose Cruz is not able to make his own financial decisions.  I cannot say that Lay should do that specifically, I assume they have a financial power of  that names her.  Please let her know the letter is available.    Vanessa Lewis  
Note is in patient's chart for future use. Printed and signed letter has been put into shredding.    Abbie Burns  
Patient requests all Lab and Radiology Results on their Discharge Instructions

## 2020-06-23 ENCOUNTER — TRANSFERRED RECORDS (OUTPATIENT)
Dept: HEALTH INFORMATION MANAGEMENT | Facility: CLINIC | Age: 70
End: 2020-06-23

## 2020-09-10 ENCOUNTER — OFFICE VISIT (OUTPATIENT)
Dept: FAMILY MEDICINE | Facility: CLINIC | Age: 70
End: 2020-09-10
Payer: COMMERCIAL

## 2020-09-10 VITALS — TEMPERATURE: 98.7 F | BODY MASS INDEX: 25.45 KG/M2 | HEIGHT: 73 IN | HEART RATE: 88 BPM | WEIGHT: 192 LBS

## 2020-09-10 DIAGNOSIS — G30.0 EARLY ONSET ALZHEIMER'S DEMENTIA WITHOUT BEHAVIORAL DISTURBANCE (H): ICD-10-CM

## 2020-09-10 DIAGNOSIS — F33.1 MODERATE EPISODE OF RECURRENT MAJOR DEPRESSIVE DISORDER (H): ICD-10-CM

## 2020-09-10 DIAGNOSIS — F02.80 EARLY ONSET ALZHEIMER'S DEMENTIA WITHOUT BEHAVIORAL DISTURBANCE (H): ICD-10-CM

## 2020-09-10 DIAGNOSIS — Z00.00 ENCOUNTER FOR MEDICARE ANNUAL WELLNESS EXAM: Primary | ICD-10-CM

## 2020-09-10 DIAGNOSIS — R35.0 URINARY FREQUENCY: ICD-10-CM

## 2020-09-10 PROCEDURE — 99397 PER PM REEVAL EST PAT 65+ YR: CPT | Performed by: FAMILY MEDICINE

## 2020-09-10 PROCEDURE — 99213 OFFICE O/P EST LOW 20 MIN: CPT | Mod: 25 | Performed by: FAMILY MEDICINE

## 2020-09-10 RX ORDER — MIRTAZAPINE 15 MG/1
15 TABLET, FILM COATED ORAL DAILY
COMMUNITY
Start: 2020-08-01

## 2020-09-10 RX ORDER — QUETIAPINE FUMARATE 25 MG/1
1-2 TABLET, FILM COATED ORAL AT BEDTIME
COMMUNITY
Start: 2020-06-13

## 2020-09-10 ASSESSMENT — ACTIVITIES OF DAILY LIVING (ADL)
CURRENT_FUNCTION: MONEY MANAGEMENT REQUIRES ASSISTANCE
CURRENT_FUNCTION: MEDICATION ADMINISTRATION REQUIRES ASSISTANCE
CURRENT_FUNCTION: SHOPPING REQUIRES ASSISTANCE
CURRENT_FUNCTION: PREPARING MEALS REQUIRES ASSISTANCE
CURRENT_FUNCTION: LAUNDRY REQUIRES ASSISTANCE
CURRENT_FUNCTION: HOUSEWORK REQUIRES ASSISTANCE
CURRENT_FUNCTION: TRANSPORTATION REQUIRES ASSISTANCE
CURRENT_FUNCTION: TELEPHONE REQUIRES ASSISTANCE
CURRENT_FUNCTION: BATHING REQUIRES ASSISTANCE

## 2020-09-10 ASSESSMENT — MIFFLIN-ST. JEOR: SCORE: 1694.79

## 2020-09-10 NOTE — PROGRESS NOTES
"SUBJECTIVE:   Jose Cruz Jameson is a 69 year old male who presents for Preventive Visit.    Are you in the first 12 months of your Medicare coverage?  No    Healthy Habits:    In general, how would you rate your overall health?  Good    Frequency of exercise:  None    Do you usually eat at least 4 servings of fruit and vegetables a day, include whole grains    & fiber and avoid regularly eating high fat or \"junk\" foods?  Yes    Taking medications regularly:  Yes    Barriers to taking medications:  None    Medication side effects:  None    Ability to successfully perform activities of daily living:  Telephone requires assistance, transportation requires assistance, shopping requires assistance, preparing meals requires assistance, money management requires assistance, medication administration requires assistance, laundry requires assistance, bathing requires assistance and housework requires assistance    Home Safety:  No safety concerns identified    Hearing Impairment:  No hearing concerns    In the past 6 months, have you been bothered by leaking of urine? Yes    In general, how would you rate your overall mental or emotional health?  Poor      PHQ-2 Total Score:    Additional concerns today:  Yes    Concerns:  * urinary frequency      Pt with early onset Alzheimers disease, progressing.  Lives in home with his wife as caregiver.  Has 24 hour supervision.  Wife sleeps in the same room as  and is aware when he wakes at night.  Dementia has been progressing.  Seeing psychiatry due to behavioral/mental health concerns.  Seems to be doing somewhat better on current meds, sleep has improved.  Still wakes some at night to urinate but much better.    Daytime frequency has been more of an issue over months.  Seems okay in the morning but progresses throughout the day and is very frequent in the afternoon.  Pt's wife has not noted any blood in the urine.  Does not seem to have pain but unable to answer.  No " incontinence.  No trouble with stooling noted    Pt's wife gets some respite from family once per week when she goes shopping, otherwise providers full time care.  Feels she has all the resources she needs currently.  Not really interested in considering home care/self-pay respite due to concern over pt's response to new people in the home.  Feels she is managing.      Do you feel safe in your environment? Unable to answer    Have you ever done Advance Care Planning? (For example, a Health Directive, POLST, or a discussion with a medical provider or your loved ones about your wishes): Yes, advance care planning is on file.      Fall risk  Fallen 2 or more times in the past year?: Yes  Any fall with injury in the past year?: No  Timed Up and Go Test (>13.5 is fall risk; contact physician) : (unable)    Cognitive Screening Not appropriate due to known dementia    Do you have sleep apnea, excessive snoring or daytime drowsiness?: no    Reviewed and updated as needed this visit by clinical staff  Tobacco  Allergies  Meds  Med Hx  Surg Hx  Fam Hx  Soc Hx        Reviewed and updated as needed this visit by Provider  Tobacco  Allergies  Meds  Med Hx  Surg Hx  Fam Hx  Soc Hx       Social History     Tobacco Use     Smoking status: Former Smoker     Packs/day: 0.15     Years: 20.00     Pack years: 3.00     Types: Cigarettes     Start date: 11/29/2013     Smokeless tobacco: Never Used     Tobacco comment: counseled 9/11/08   Substance Use Topics     Alcohol use: No     If you drink alcohol do you typically have >3 drinks per day or >7 drinks per week? No    Alcohol Use 9/10/2020   Prescreen: >3 drinks/day or >7 drinks/week? No         Current providers sharing in care for this patient include:   Patient Care Team:  Vanessa Lewis DO as PCP - General (Family Practice)  Vanessa Lewis DO as Assigned PCP    The following health maintenance items are reviewed in Epic and correct as of today:  Health Maintenance  "  Topic Date Due     ZOSTER IMMUNIZATION (2 of 3) 11/24/2017     PNEUMOCOCCAL IMMUNIZATION 65+ LOW/MEDIUM RISK (2 of 2 - PPSV23) 06/20/2018     PHQ-9  04/16/2019     INFLUENZA VACCINE (1) 09/01/2020     MEDICARE ANNUAL WELLNESS VISIT  09/10/2021     FALL RISK ASSESSMENT  09/10/2021     LIPID  06/20/2022     ADVANCE CARE PLANNING  09/10/2025     DTAP/TDAP/TD IMMUNIZATION (4 - Td) 06/20/2027     COLORECTAL CANCER SCREENING  07/10/2027     DEPRESSION ACTION PLAN  Completed     AORTIC ANEURYSM SCREENING (SYSTEM ASSIGNED)  Completed     HEPATITIS C SCREENING  Addressed     IPV IMMUNIZATION  Aged Out     MENINGITIS IMMUNIZATION  Aged Out     HEPATITIS B IMMUNIZATION  Aged Out       Review of Systems  Constitutional, HEENT, cardiovascular, pulmonary, gi and gu systems are negative, except as otherwise noted. Pt unable to report.  ROS per pt's wife    OBJECTIVE:   Pulse 88   Temp 98.7  F (37.1  C) (Tympanic)   Ht 1.854 m (6' 1\")   Wt 87.1 kg (192 lb)   BMI 25.33 kg/m   Estimated body mass index is 25.33 kg/m  as calculated from the following:    Height as of this encounter: 1.854 m (6' 1\").    Weight as of this encounter: 87.1 kg (192 lb).  Physical Exam  PE:  VS as above, unable to tolerate BP   Gen:  WN/WD/WH male in NAD, mildly agitated in exam room, wants to leave   Heart:  RRR without murmur, nl S1, S2, no rubs or gallops   Lungs CTA elvis without rales/ronchi/wheezes   Ext:  No pedal edema   Neuro:  Oriented to self only      Diagnostic Test Results:  none     ASSESSMENT / PLAN:       ICD-10-CM    1. Encounter for Medicare annual wellness exam  Z00.00    2. Urinary frequency  R35.0 UA with Microscopic reflex to Culture   3. Early onset Alzheimer's dementia without behavioral disturbance (H)  G30.0     F02.80    4. Moderate episode of recurrent major depressive disorder (H)  F33.1      Reviewed options for urinary frequency.  Ideally would r/o infection as possible underlying cause.  They will bring home cup to try " "for collection.  Reviewed consideration of PSA and risk/limitation.  Wife appropriately declines.  Briefly reviewed trial of medication for presumed LUTS as well as possible side effects once UTI ruled out.  Pt's wife unsure if she wishes to pursue as symptom does not seem troublesome to pt.  Will notify her of results when available.    Continue f/u with psychiatry for mood/behavior management.    Reviewed support needs in the home, pt has 24 hour supervision and pt's wife/caregiver denies any need for additional support/services currently      COUNSELING:  Reviewed preventive health counseling, as reflected in patient instructions       preventative health counseling not applicable    Estimated body mass index is 25.33 kg/m  as calculated from the following:    Height as of this encounter: 1.854 m (6' 1\").    Weight as of this encounter: 87.1 kg (192 lb).        He reports that he has quit smoking. His smoking use included cigarettes. He started smoking about 6 years ago. He has a 3.00 pack-year smoking history. He has never used smokeless tobacco.      Appropriate preventive services were discussed with this patient, including applicable screening as appropriate for cardiovascular disease, diabetes, osteopenia/osteoporosis, and glaucoma.  As appropriate for age/gender, discussed screening for colorectal cancer, prostate cancer, breast cancer, and cervical cancer. Checklist reviewing preventive services available has been given to the patient.    Reviewed patients plan of care and provided an AVS. The Complex Care Plan (for patients with higher acuity and needing more deliberate coordination of services) for Jose Cruz meets the Care Plan requirement. This Care Plan has been established and reviewed with the Patient.    Counseling Resources:  ATP IV Guidelines  Pooled Cohorts Equation Calculator  Breast Cancer Risk Calculator  Breast Cancer: Medication to Reduce Risk  FRAX Risk Assessment  ICSI Preventive " Guidelines  Dietary Guidelines for Americans, 2010  USDA's MyPlate  ASA Prophylaxis  Lung CA Screening    Vanessa Lewis,   [facility]    Identified Health Risks:

## 2020-09-10 NOTE — PATIENT INSTRUCTIONS
Patient Education   Personalized Prevention Plan  You are due for the preventive services outlined below.  Your care team is available to assist you in scheduling these services.  If you have already completed any of these items, please share that information with your care team to update in your medical record.  Health Maintenance Due   Topic Date Due     Zoster (Shingles) Vaccine (2 of 3) 11/24/2017     Pneumococcal Vaccine (2 of 2 - PPSV23) 06/20/2018     Depression Assessment  04/16/2019     Annual Wellness Visit  03/05/2020     FALL RISK ASSESSMENT  03/05/2020     Flu Vaccine (1) 09/01/2020     Preventive Health Recommendations  See your health care provider every year to    Review health changes.     Discuss preventive care.      Review your medicines if your doctor has prescribed any.    Talk with your health care provider about whether you should have a test to screen for prostate cancer (PSA).    Every 3 years, have a diabetes test (fasting glucose). If you are at risk for diabetes, you should have this test more often.    Every 5 years, have a cholesterol test. Have this test more often if you are at risk for high cholesterol or heart disease.     Every 10 years, have a colonoscopy. Or, have a yearly FIT test (stool test). These exams will check for colon cancer.    Talk to with your health care provider about screening for Abdominal Aortic Aneurysm if you have a family history of AAA or have a history of smoking.    Shots:     Get a flu shot each year.     Get a tetanus shot every 10 years.     Talk to your doctor about your pneumonia vaccines. There are now two you should receive - Pneumovax (PPSV 23) and Prevnar (PCV 13).    Talk to your pharmacist about a shingles vaccine.     Talk to your doctor about the hepatitis B vaccine.    Nutrition:     Eat at least 5 servings of fruits and vegetables each day.     Eat whole-grain bread, whole-wheat pasta and brown rice instead of white grains and rice.      Get adequate Calcium and Vitamin D.     Lifestyle    Exercise for at least 150 minutes a week (30 minutes a day, 5 days a week). This will help you control your weight and prevent disease.     Limit alcohol to one drink per day.     No smoking.     Wear sunscreen to prevent skin cancer.     See your dentist every six months for an exam and cleaning.     See your eye doctor every 1 to 2 years to screen for conditions such as glaucoma, macular degeneration and cataracts.    Personalized Prevention Plan  You are due for the preventive services outlined below.  Your care team is available to assist you in scheduling these services.  If you have already completed any of these items, please share that information with your care team to update in your medical record.  Health Maintenance   Topic Date Due     ZOSTER IMMUNIZATION (2 of 3) 11/24/2017     PNEUMOCOCCAL IMMUNIZATION 65+ LOW/MEDIUM RISK (2 of 2 - PPSV23) 06/20/2018     PHQ-9  04/16/2019     MEDICARE ANNUAL WELLNESS VISIT  03/05/2020     FALL RISK ASSESSMENT  03/05/2020     INFLUENZA VACCINE (1) 09/01/2020     LIPID  06/20/2022     ADVANCE CARE PLANNING  03/27/2024     DTAP/TDAP/TD IMMUNIZATION (4 - Td) 06/20/2027     COLORECTAL CANCER SCREENING  07/10/2027     DEPRESSION ACTION PLAN  Completed     AORTIC ANEURYSM SCREENING (SYSTEM ASSIGNED)  Completed     HEPATITIS C SCREENING  Addressed     IPV IMMUNIZATION  Aged Out     MENINGITIS IMMUNIZATION  Aged Out     HEPATITIS B IMMUNIZATION  Aged Out

## 2020-09-18 DIAGNOSIS — R35.0 URINARY FREQUENCY: ICD-10-CM

## 2020-09-18 LAB
ALBUMIN UR-MCNC: NEGATIVE MG/DL
APPEARANCE UR: CLEAR
BILIRUB UR QL STRIP: NEGATIVE
COLOR UR AUTO: YELLOW
GLUCOSE UR STRIP-MCNC: NEGATIVE MG/DL
HGB UR QL STRIP: NEGATIVE
KETONES UR STRIP-MCNC: NEGATIVE MG/DL
LEUKOCYTE ESTERASE UR QL STRIP: NEGATIVE
MUCOUS THREADS #/AREA URNS LPF: PRESENT /LPF
NITRATE UR QL: NEGATIVE
PH UR STRIP: 7 PH (ref 5–7)
RBC #/AREA URNS AUTO: ABNORMAL /HPF
SOURCE: ABNORMAL
SP GR UR STRIP: 1.01 (ref 1–1.03)
UROBILINOGEN UR STRIP-ACNC: 0.2 EU/DL (ref 0.2–1)
WBC #/AREA URNS AUTO: ABNORMAL /HPF

## 2020-09-18 PROCEDURE — 81001 URINALYSIS AUTO W/SCOPE: CPT | Performed by: FAMILY MEDICINE

## 2020-11-18 ENCOUNTER — TRANSFERRED RECORDS (OUTPATIENT)
Dept: HEALTH INFORMATION MANAGEMENT | Facility: CLINIC | Age: 70
End: 2020-11-18

## 2021-01-15 ENCOUNTER — HEALTH MAINTENANCE LETTER (OUTPATIENT)
Age: 71
End: 2021-01-15

## 2021-01-26 ENCOUNTER — TRANSFERRED RECORDS (OUTPATIENT)
Dept: HEALTH INFORMATION MANAGEMENT | Facility: CLINIC | Age: 71
End: 2021-01-26

## 2021-03-10 ENCOUNTER — IMMUNIZATION (OUTPATIENT)
Dept: PEDIATRICS | Facility: CLINIC | Age: 71
End: 2021-03-10
Payer: COMMERCIAL

## 2021-03-10 PROCEDURE — 0001A PR COVID VAC PFIZER DIL RECON 30 MCG/0.3 ML IM: CPT

## 2021-03-10 PROCEDURE — 91300 PR COVID VAC PFIZER DIL RECON 30 MCG/0.3 ML IM: CPT

## 2021-03-31 ENCOUNTER — IMMUNIZATION (OUTPATIENT)
Dept: PEDIATRICS | Facility: CLINIC | Age: 71
End: 2021-03-31
Attending: INTERNAL MEDICINE
Payer: COMMERCIAL

## 2021-03-31 PROCEDURE — 91300 PR COVID VAC PFIZER DIL RECON 30 MCG/0.3 ML IM: CPT

## 2021-03-31 PROCEDURE — 0002A PR COVID VAC PFIZER DIL RECON 30 MCG/0.3 ML IM: CPT

## 2021-05-25 ENCOUNTER — RECORDS - HEALTHEAST (OUTPATIENT)
Dept: ADMINISTRATIVE | Facility: CLINIC | Age: 71
End: 2021-05-25

## 2021-07-02 ENCOUNTER — TELEPHONE (OUTPATIENT)
Dept: FAMILY MEDICINE | Facility: CLINIC | Age: 71
End: 2021-07-02

## 2021-07-02 NOTE — TELEPHONE ENCOUNTER
Reason for call:  Other   Patient called regarding (reason for call): call back  Additional comments: Patient's wife is calling because patient is having increasing mobility issues and she is wondering about hospice care. Please call wife.     Phone number to reach patient:  Other phone number:  [phone]    Best Time:  Any time    Can we leave a detailed message on this number?  YES    Travel screening: Not Applicable

## 2021-07-02 NOTE — TELEPHONE ENCOUNTER
Call placed to patient's wife     Wife states patient has exhibited a full decline   States patient has been having increasing falls - no injuries and increasing difficulty with mobility     Wife would like to discuss home care qualifications due to patient's mobility decline  Telephone visit scheduled with Dr. Lewis for Thursday July 8th at 1130    Unsure which appointment would be most beneficial - a telephone or in person appointment to discuss qualifications for home care  Patient's wife okay with either option on July 8th at 1130    Will route to Dr. Lewis to review  Wife aware that Dr. Lewis is not in clinic until Tuesday to review     Alton Reyes RN

## 2021-07-02 NOTE — TELEPHONE ENCOUNTER
Call placed to patient's wife  Relayed Dr. Cui's message    Patient's wife verbalized understanding  Scheduled appointment changed from telephone to in person    No further questions/concerns    Alton Reyes RN

## 2021-07-08 ENCOUNTER — OFFICE VISIT (OUTPATIENT)
Dept: FAMILY MEDICINE | Facility: CLINIC | Age: 71
End: 2021-07-08
Payer: COMMERCIAL

## 2021-07-08 VITALS
HEART RATE: 56 BPM | DIASTOLIC BLOOD PRESSURE: 78 MMHG | SYSTOLIC BLOOD PRESSURE: 136 MMHG | OXYGEN SATURATION: 93 % | RESPIRATION RATE: 16 BRPM | TEMPERATURE: 96.8 F

## 2021-07-08 DIAGNOSIS — F02.818 EARLY ONSET ALZHEIMER'S DISEASE WITH BEHAVIORAL DISTURBANCE (H): Primary | ICD-10-CM

## 2021-07-08 DIAGNOSIS — G30.0 EARLY ONSET ALZHEIMER'S DISEASE WITH BEHAVIORAL DISTURBANCE (H): Primary | ICD-10-CM

## 2021-07-08 PROCEDURE — 99213 OFFICE O/P EST LOW 20 MIN: CPT | Performed by: FAMILY MEDICINE

## 2021-07-08 RX ORDER — ESCITALOPRAM OXALATE 10 MG/1
10 TABLET ORAL DAILY
COMMUNITY
Start: 2021-06-03

## 2021-07-08 RX ORDER — RISPERIDONE 0.25 MG/1
TABLET ORAL
COMMUNITY
Start: 2021-03-17

## 2021-07-08 NOTE — PROGRESS NOTES
A/p:      ICD-10-CM    1. Early onset Alzheimer's disease with behavioral disturbance (H)  G30.0 HOSPICE REFERRAL    F02.81      Pt has had steady decline over the last 10 months since our last clinic visit.  His mobility is much decreased now and he requires assistance to get in and out of his home, now using a wheelchair for transportation outside of the home.  Communication has also decreased and he is no longer conversant although does speak and occasionally responds directly.  Appetite is decreased and pt's wife feels he has lost weight although she has not been able to weigh him.    Pt does have a terminal diagnosis and current status appropriate for consideration of hospice care given decline.  Pt's wife is interested in pursuing.  Hospice referral placed.  Reviewed services provided and limitations of hospice care.    Nura Billingsley is a 70 year old who presents for the following health issues  accompanied by his spouse:    HPI   *  Decline in mobility - would like to discuss home care or hospice  *  Not able to fill out PHQ9    Has had a pretty steady decline since his last clinic visit.  Mobility has been much more of an issue.  He struggles with balance and his wife and caretaker struggles to safely help him to exit the home.  Indoors he simply does not move much, will drag a chair with him across the room at times.  Difficult to try to help him in and out of bed and turn him once he is down.  Worries he might be developing a pressure reaction over his sacrum as she is really not able to reposition him once he is in bed.    Verbal conversation is much .  He will occasionally respond to direct questions but more commonly just repeats phrases or yells.  Appetite has been decreased as well.  Eating and feeding himself but not as much.  She has not been able to get him on a scale at home safely.    Continues to visit with psychiatry.  No new medications.  Has been on antipsychotics to manage  night time symptoms and they have been working well.  Pt's wife feels nighttime behaviors have been controlled and he seems to sleep well now.    Pt's wife recognizes the need for equipment in the home to provide improved safety for pt and allow for repositioning and easier transfers.  Wondering if pt might qualify for hospice or if home care is an option.    Review of Systems   Constitutional, HEENT, cardiovascular, pulmonary, gi and gu systems are negative, except as otherwise noted.  Obtained from pt's wife      Objective    /78   Pulse 56   Temp 96.8  F (36  C) (Tympanic)   Resp 16   SpO2 93%   There is no height or weight on file to calculate BMI.  Physical Exam   PE:  VS as above   Gen:  WN/WD/WH male in NAD, seated in a wheelchair.  Alert, not oriented.  Occasionally calling out, not responsive to questions   Skin:  Area of reddened skin overlying sacrum, blanching, no defect in skin.    Epic reviewed

## 2021-07-19 ENCOUNTER — DOCUMENTATION ONLY (OUTPATIENT)
Dept: OTHER | Facility: CLINIC | Age: 71
End: 2021-07-19

## 2021-07-19 ENCOUNTER — TELEPHONE (OUTPATIENT)
Dept: FAMILY MEDICINE | Facility: CLINIC | Age: 71
End: 2021-07-19

## 2021-07-19 NOTE — TELEPHONE ENCOUNTER
Reason for Call:  Home Health Care    [facility] called regarding (reason for call): Please call with verbal orders    Orders are needed for this patient.     Skilled Nursing: Asking if Dr. Lewis will sign hospice orders for pt?      Pt Provider: Joshua    Phone Number Homecare Nurse can be reached at: [phone]    Can we leave a detailed message on this number? YES    Phone number patient can be reached at: Home number on file [phone] (home)    Best Time: any    Call taken on 7/19/2021 at 9:31 AM by Alix Harrington

## 2021-07-19 NOTE — TELEPHONE ENCOUNTER
I left a message for 'Jennifer Weaver' [phone] to return my call.    Josette BATISTA RN, BSN

## 2021-07-19 NOTE — TELEPHONE ENCOUNTER
ERICH Rodriguez called back and okay given.  They will also use hospice standing orders.    Josette BATISTA RN, BSN

## 2021-07-31 ENCOUNTER — MEDICAL CORRESPONDENCE (OUTPATIENT)
Dept: HEALTH INFORMATION MANAGEMENT | Facility: CLINIC | Age: 71
End: 2021-07-31

## 2021-08-17 ENCOUNTER — MEDICAL CORRESPONDENCE (OUTPATIENT)
Dept: HEALTH INFORMATION MANAGEMENT | Facility: CLINIC | Age: 71
End: 2021-08-17

## 2021-09-02 ENCOUNTER — MEDICAL CORRESPONDENCE (OUTPATIENT)
Dept: HEALTH INFORMATION MANAGEMENT | Facility: CLINIC | Age: 71
End: 2021-09-02

## 2021-09-04 ENCOUNTER — HEALTH MAINTENANCE LETTER (OUTPATIENT)
Age: 71
End: 2021-09-04

## 2021-10-30 ENCOUNTER — HEALTH MAINTENANCE LETTER (OUTPATIENT)
Age: 71
End: 2021-10-30

## 2022-01-06 ENCOUNTER — MEDICAL CORRESPONDENCE (OUTPATIENT)
Dept: HEALTH INFORMATION MANAGEMENT | Facility: CLINIC | Age: 72
End: 2022-01-06
Payer: COMMERCIAL

## 2022-02-03 ENCOUNTER — MEDICAL CORRESPONDENCE (OUTPATIENT)
Dept: HEALTH INFORMATION MANAGEMENT | Facility: CLINIC | Age: 72
End: 2022-02-03
Payer: COMMERCIAL

## 2022-03-03 ENCOUNTER — MEDICAL CORRESPONDENCE (OUTPATIENT)
Dept: HEALTH INFORMATION MANAGEMENT | Facility: CLINIC | Age: 72
End: 2022-03-03
Payer: COMMERCIAL

## 2022-10-22 ENCOUNTER — HEALTH MAINTENANCE LETTER (OUTPATIENT)
Age: 72
End: 2022-10-22

## 2022-12-04 ENCOUNTER — HEALTH MAINTENANCE LETTER (OUTPATIENT)
Age: 72
End: 2022-12-04

## 2024-01-14 ENCOUNTER — HEALTH MAINTENANCE LETTER (OUTPATIENT)
Age: 74
End: 2024-01-14

## (undated) DEVICE — SOL WATER IRRIG 1000ML BOTTLE 07139-09

## (undated) RX ORDER — REGADENOSON 0.08 MG/ML
INJECTION, SOLUTION INTRAVENOUS
Status: DISPENSED
Start: 2018-04-10